# Patient Record
Sex: MALE | Race: WHITE | NOT HISPANIC OR LATINO
[De-identification: names, ages, dates, MRNs, and addresses within clinical notes are randomized per-mention and may not be internally consistent; named-entity substitution may affect disease eponyms.]

---

## 2017-10-05 ENCOUNTER — APPOINTMENT (OUTPATIENT)
Dept: ORTHOPEDIC SURGERY | Facility: CLINIC | Age: 67
End: 2017-10-05
Payer: MEDICARE

## 2017-10-05 VITALS
HEIGHT: 70 IN | BODY MASS INDEX: 22.9 KG/M2 | SYSTOLIC BLOOD PRESSURE: 127 MMHG | WEIGHT: 160 LBS | HEART RATE: 64 BPM | DIASTOLIC BLOOD PRESSURE: 81 MMHG

## 2017-10-05 DIAGNOSIS — Z78.9 OTHER SPECIFIED HEALTH STATUS: ICD-10-CM

## 2017-10-05 DIAGNOSIS — Z87.891 PERSONAL HISTORY OF NICOTINE DEPENDENCE: ICD-10-CM

## 2017-10-05 DIAGNOSIS — Z80.0 FAMILY HISTORY OF MALIGNANT NEOPLASM OF DIGESTIVE ORGANS: ICD-10-CM

## 2017-10-05 DIAGNOSIS — Z86.79 PERSONAL HISTORY OF OTHER DISEASES OF THE CIRCULATORY SYSTEM: ICD-10-CM

## 2017-10-05 PROCEDURE — 20550 NJX 1 TENDON SHEATH/LIGAMENT: CPT | Mod: LT

## 2017-10-05 PROCEDURE — 99203 OFFICE O/P NEW LOW 30 MIN: CPT | Mod: 25

## 2017-10-05 RX ORDER — CLINDAMYCIN PHOSPHATE 10 MG/ML
1 SOLUTION TOPICAL
Qty: 60 | Refills: 0 | Status: ACTIVE | COMMUNITY
Start: 2017-06-09

## 2017-10-05 RX ORDER — ATORVASTATIN CALCIUM 20 MG/1
20 TABLET, FILM COATED ORAL
Qty: 30 | Refills: 0 | Status: ACTIVE | COMMUNITY
Start: 2017-08-28

## 2017-10-05 RX ORDER — CLOPIDOGREL BISULFATE 300 MG/1
TABLET, FILM COATED ORAL
Refills: 0 | Status: ACTIVE | COMMUNITY

## 2017-10-05 RX ORDER — MELOXICAM 15 MG/1
15 TABLET ORAL
Qty: 30 | Refills: 0 | Status: ACTIVE | COMMUNITY
Start: 2017-08-17

## 2017-10-05 RX ORDER — CLOPIDOGREL BISULFATE 75 MG/1
75 TABLET, FILM COATED ORAL
Qty: 90 | Refills: 0 | Status: ACTIVE | COMMUNITY
Start: 2017-07-31

## 2017-10-05 RX ORDER — ATORVASTATIN CALCIUM 80 MG/1
TABLET, FILM COATED ORAL
Refills: 0 | Status: ACTIVE | COMMUNITY

## 2017-10-26 ENCOUNTER — APPOINTMENT (OUTPATIENT)
Dept: ORTHOPEDIC SURGERY | Facility: CLINIC | Age: 67
End: 2017-10-26
Payer: MEDICARE

## 2017-10-26 PROCEDURE — 99214 OFFICE O/P EST MOD 30 MIN: CPT

## 2017-11-06 ENCOUNTER — OUTPATIENT (OUTPATIENT)
Dept: OUTPATIENT SERVICES | Facility: HOSPITAL | Age: 67
LOS: 1 days | End: 2017-11-06
Payer: MEDICARE

## 2017-11-06 VITALS
HEART RATE: 68 BPM | WEIGHT: 162.04 LBS | SYSTOLIC BLOOD PRESSURE: 117 MMHG | DIASTOLIC BLOOD PRESSURE: 75 MMHG | TEMPERATURE: 99 F | OXYGEN SATURATION: 98 % | RESPIRATION RATE: 16 BRPM | HEIGHT: 70 IN

## 2017-11-06 DIAGNOSIS — M65.322 TRIGGER FINGER, LEFT INDEX FINGER: ICD-10-CM

## 2017-11-06 DIAGNOSIS — Z98.890 OTHER SPECIFIED POSTPROCEDURAL STATES: Chronic | ICD-10-CM

## 2017-11-06 DIAGNOSIS — I25.10 ATHEROSCLEROTIC HEART DISEASE OF NATIVE CORONARY ARTERY WITHOUT ANGINA PECTORIS: ICD-10-CM

## 2017-11-06 DIAGNOSIS — Z01.818 ENCOUNTER FOR OTHER PREPROCEDURAL EXAMINATION: ICD-10-CM

## 2017-11-06 PROCEDURE — G0463: CPT

## 2017-11-06 RX ORDER — LIDOCAINE HCL 20 MG/ML
0.2 VIAL (ML) INJECTION ONCE
Qty: 0 | Refills: 0 | Status: DISCONTINUED | OUTPATIENT
Start: 2017-11-13 | End: 2017-11-28

## 2017-11-06 RX ORDER — SODIUM CHLORIDE 9 MG/ML
3 INJECTION INTRAMUSCULAR; INTRAVENOUS; SUBCUTANEOUS EVERY 8 HOURS
Qty: 0 | Refills: 0 | Status: DISCONTINUED | OUTPATIENT
Start: 2017-11-13 | End: 2017-11-28

## 2017-11-06 RX ORDER — ACETAMINOPHEN 500 MG
975 TABLET ORAL ONCE
Qty: 0 | Refills: 0 | Status: COMPLETED | OUTPATIENT
Start: 2017-11-13 | End: 2017-11-13

## 2017-11-06 NOTE — H&P PST ADULT - NSANTHOSAYNRD_GEN_A_CORE
No. EDY screening performed.  STOP BANG Legend: 0-2 = LOW Risk; 3-4 = INTERMEDIATE Risk; 5-8 = HIGH Risk

## 2017-11-06 NOTE — H&P PST ADULT - PSH
History of lumbar laminectomy  L5-S1  History of percutaneous coronary intervention  with DEMARIO x 2, 2015

## 2017-11-06 NOTE — H&P PST ADULT - ATTENDING COMMENTS
The patient has ongoing pain left index finger. There is minimal active triggering but there is considerable pain difficulty flexing the finger. Treatment options have been reviewed with patient, including additional injections, hand therapy, splinting.  The patient  presents today for left index finger trigger release surgery, examination under anesthesia, possible partial tendon excision, possible tenosynovectomy. The patient is likely to require hand therapy postop. Patient is unlikely to regain 100% recovery and complete relief of pain.    Surgery for left index trigger finger is indicated because of symptoms refractory to conservative treatment. The patient has pain and interference with activities of daily living. While the patient may see improvement, the patient may not have complete range of motion or complete return to activities of daily living. Postoperative hand therapy may be required. The range of treatment alternatives, and the associated risks complications limitations and expectations were explained and discussed. All questions have been answered. The patient has expressed acceptance and understanding of the above and has consented to surgery.

## 2017-11-06 NOTE — H&P PST ADULT - PMH
CAD (coronary artery disease)  h/o abnormal stress text, prompted PCI with stenting x 2 arteries in 2015  on plavix and aspirin  H/O hyperlipidemia    Trigger index finger of left hand

## 2017-11-06 NOTE — H&P PST ADULT - HISTORY OF PRESENT ILLNESS
67 year old male with h/o CAD s/p PCI with stenting x2 (LAD, CX), Hyperlipidemia 67 year old male with h/o CAD s/p PCI with stenting x2 (LAD, CX) 2015, Hyperlipidemia, presents to pre-admission testing for scheduled release trigger finger left index finger, possible tenosynovectomy.

## 2017-11-06 NOTE — H&P PST ADULT - PROBLEM SELECTOR PLAN 2
no plan for plavix, left message for cardiologist  patient will stop at cardiologist office 11/6/17   will continue aspirin naty-op  most recent ekg/ stress/ echo to be obtained from cardiologist no plan for plavix, left message for cardiologist  patient stopped at cardiologist office, and note for plavix plan to be faxed to [facility]  will continue aspirin naty-op  most recent ekg/ stress/ echo to be obtained from cardiologist

## 2017-11-12 RX ORDER — CELECOXIB 200 MG/1
200 CAPSULE ORAL ONCE
Qty: 0 | Refills: 0 | Status: DISCONTINUED | OUTPATIENT
Start: 2017-11-13 | End: 2017-11-28

## 2017-11-12 RX ORDER — ONDANSETRON 8 MG/1
4 TABLET, FILM COATED ORAL ONCE
Qty: 0 | Refills: 0 | Status: DISCONTINUED | OUTPATIENT
Start: 2017-11-13 | End: 2017-11-28

## 2017-11-12 RX ORDER — SODIUM CHLORIDE 9 MG/ML
1000 INJECTION, SOLUTION INTRAVENOUS
Qty: 0 | Refills: 0 | Status: DISCONTINUED | OUTPATIENT
Start: 2017-11-13 | End: 2017-11-28

## 2017-11-13 ENCOUNTER — TRANSCRIPTION ENCOUNTER (OUTPATIENT)
Age: 67
End: 2017-11-13

## 2017-11-13 ENCOUNTER — RESULT REVIEW (OUTPATIENT)
Age: 67
End: 2017-11-13

## 2017-11-13 ENCOUNTER — APPOINTMENT (OUTPATIENT)
Dept: ORTHOPEDIC SURGERY | Facility: HOSPITAL | Age: 67
End: 2017-11-13

## 2017-11-13 ENCOUNTER — OUTPATIENT (OUTPATIENT)
Dept: OUTPATIENT SERVICES | Facility: HOSPITAL | Age: 67
LOS: 1 days | End: 2017-11-13
Payer: MEDICARE

## 2017-11-13 VITALS
HEART RATE: 55 BPM | RESPIRATION RATE: 16 BRPM | WEIGHT: 162.04 LBS | SYSTOLIC BLOOD PRESSURE: 125 MMHG | TEMPERATURE: 98 F | DIASTOLIC BLOOD PRESSURE: 74 MMHG | HEIGHT: 70 IN | OXYGEN SATURATION: 98 %

## 2017-11-13 VITALS
HEART RATE: 52 BPM | DIASTOLIC BLOOD PRESSURE: 75 MMHG | RESPIRATION RATE: 15 BRPM | OXYGEN SATURATION: 100 % | SYSTOLIC BLOOD PRESSURE: 138 MMHG

## 2017-11-13 DIAGNOSIS — Z98.890 OTHER SPECIFIED POSTPROCEDURAL STATES: Chronic | ICD-10-CM

## 2017-11-13 DIAGNOSIS — Z01.818 ENCOUNTER FOR OTHER PREPROCEDURAL EXAMINATION: ICD-10-CM

## 2017-11-13 DIAGNOSIS — M65.322 TRIGGER FINGER, LEFT INDEX FINGER: ICD-10-CM

## 2017-11-13 PROCEDURE — 26145 TENDON EXCISION PALM/FINGER: CPT | Mod: F1

## 2017-11-13 PROCEDURE — 88304 TISSUE EXAM BY PATHOLOGIST: CPT | Mod: 26

## 2017-11-13 PROCEDURE — 88304 TISSUE EXAM BY PATHOLOGIST: CPT

## 2017-11-13 RX ORDER — CELECOXIB 200 MG/1
200 CAPSULE ORAL ONCE
Qty: 0 | Refills: 0 | Status: COMPLETED | OUTPATIENT
Start: 2017-11-13 | End: 2017-11-13

## 2017-11-13 RX ADMIN — Medication 975 MILLIGRAM(S): at 09:39

## 2017-11-13 RX ADMIN — CELECOXIB 200 MILLIGRAM(S): 200 CAPSULE ORAL at 09:39

## 2017-11-13 NOTE — ASU DISCHARGE PLAN (ADULT/PEDIATRIC). - MEDICATION SUMMARY - MEDICATIONS TO TAKE
I will START or STAY ON the medications listed below when I get home from the hospital:    aspirin 81 mg oral tablet  -- 1 tab(s) by mouth once a day  -- Indication: For Home medication    atorvastatin 20 mg oral tablet  -- 1 tab(s) by mouth once a day  -- Indication: For Home medication    clopidogrel 75 mg oral tablet  -- 1 tab(s) by mouth once a day  -- Indication: For Home medication    Pepcid 20 mg oral tablet  -- one tablet the evening before and one tablet the day of surgery per protocol  -- Indication: For Home medication

## 2017-11-13 NOTE — ASU DISCHARGE PLAN (ADULT/PEDIATRIC). - NOTIFY
Bleeding that does not stop/Pain not relieved by Medications/Numbness, color, or temperature change to extremity/Swelling that continues/Fever greater than 101

## 2017-11-13 NOTE — BRIEF OPERATIVE NOTE - PROCEDURE
<<-----Click on this checkbox to enter Procedure Trigger finger release of left hand  11/13/2017    Active  ASATIN

## 2017-11-20 ENCOUNTER — APPOINTMENT (OUTPATIENT)
Dept: ORTHOPEDIC SURGERY | Facility: CLINIC | Age: 67
End: 2017-11-20
Payer: MEDICARE

## 2017-11-20 LAB — SURGICAL PATHOLOGY STUDY: SIGNIFICANT CHANGE UP

## 2017-11-20 PROCEDURE — 99024 POSTOP FOLLOW-UP VISIT: CPT

## 2017-12-26 ENCOUNTER — MESSAGE (OUTPATIENT)
Age: 67
End: 2017-12-26

## 2018-01-10 ENCOUNTER — MOBILE ON CALL (OUTPATIENT)
Age: 68
End: 2018-01-10

## 2018-01-11 ENCOUNTER — APPOINTMENT (OUTPATIENT)
Dept: ORTHOPEDIC SURGERY | Facility: CLINIC | Age: 68
End: 2018-01-11
Payer: MEDICARE

## 2018-01-11 DIAGNOSIS — Z00.00 ENCOUNTER FOR GENERAL ADULT MEDICAL EXAMINATION W/OUT ABNORMAL FINDINGS: ICD-10-CM

## 2018-01-11 PROCEDURE — 20526 THER INJECTION CARP TUNNEL: CPT | Mod: 79,RT

## 2018-01-11 PROCEDURE — 99213 OFFICE O/P EST LOW 20 MIN: CPT | Mod: 25,24

## 2018-04-12 ENCOUNTER — APPOINTMENT (OUTPATIENT)
Dept: ORTHOPEDIC SURGERY | Facility: CLINIC | Age: 68
End: 2018-04-12
Payer: MEDICARE

## 2018-04-12 PROCEDURE — 20526 THER INJECTION CARP TUNNEL: CPT | Mod: RT

## 2018-04-12 PROCEDURE — 99214 OFFICE O/P EST MOD 30 MIN: CPT | Mod: 25

## 2018-05-31 ENCOUNTER — MESSAGE (OUTPATIENT)
Age: 68
End: 2018-05-31

## 2018-06-26 ENCOUNTER — APPOINTMENT (OUTPATIENT)
Dept: ORTHOPEDIC SURGERY | Facility: CLINIC | Age: 68
End: 2018-06-26
Payer: MEDICARE

## 2018-06-26 PROCEDURE — 99214 OFFICE O/P EST MOD 30 MIN: CPT

## 2018-07-17 ENCOUNTER — APPOINTMENT (OUTPATIENT)
Dept: NEUROLOGY | Facility: CLINIC | Age: 68
End: 2018-07-17
Payer: MEDICARE

## 2018-07-17 PROCEDURE — 95885 MUSC TST DONE W/NERV TST LIM: CPT

## 2018-07-17 PROCEDURE — 95910 NRV CNDJ TEST 7-8 STUDIES: CPT

## 2018-07-23 ENCOUNTER — MESSAGE (OUTPATIENT)
Age: 68
End: 2018-07-23

## 2018-08-06 ENCOUNTER — OUTPATIENT (OUTPATIENT)
Dept: OUTPATIENT SERVICES | Facility: HOSPITAL | Age: 68
LOS: 1 days | End: 2018-08-06
Payer: MEDICARE

## 2018-08-06 VITALS
DIASTOLIC BLOOD PRESSURE: 80 MMHG | RESPIRATION RATE: 16 BRPM | TEMPERATURE: 98 F | OXYGEN SATURATION: 98 % | SYSTOLIC BLOOD PRESSURE: 145 MMHG | WEIGHT: 162.04 LBS | HEART RATE: 52 BPM | HEIGHT: 70 IN

## 2018-08-06 DIAGNOSIS — Z98.890 OTHER SPECIFIED POSTPROCEDURAL STATES: Chronic | ICD-10-CM

## 2018-08-06 DIAGNOSIS — M65.30 TRIGGER FINGER, UNSPECIFIED FINGER: Chronic | ICD-10-CM

## 2018-08-06 DIAGNOSIS — M65.331 TRIGGER FINGER, RIGHT MIDDLE FINGER: ICD-10-CM

## 2018-08-06 DIAGNOSIS — G56.01 CARPAL TUNNEL SYNDROME, RIGHT UPPER LIMB: ICD-10-CM

## 2018-08-06 DIAGNOSIS — Z01.818 ENCOUNTER FOR OTHER PREPROCEDURAL EXAMINATION: ICD-10-CM

## 2018-08-06 DIAGNOSIS — Z95.5 PRESENCE OF CORONARY ANGIOPLASTY IMPLANT AND GRAFT: ICD-10-CM

## 2018-08-06 LAB
ANION GAP SERPL CALC-SCNC: 11 MMOL/L — SIGNIFICANT CHANGE UP (ref 5–17)
BUN SERPL-MCNC: 14 MG/DL — SIGNIFICANT CHANGE UP (ref 7–23)
CALCIUM SERPL-MCNC: 9.3 MG/DL — SIGNIFICANT CHANGE UP (ref 8.4–10.5)
CHLORIDE SERPL-SCNC: 104 MMOL/L — SIGNIFICANT CHANGE UP (ref 96–108)
CO2 SERPL-SCNC: 24 MMOL/L — SIGNIFICANT CHANGE UP (ref 22–31)
CREAT SERPL-MCNC: 0.93 MG/DL — SIGNIFICANT CHANGE UP (ref 0.5–1.3)
GLUCOSE SERPL-MCNC: 94 MG/DL — SIGNIFICANT CHANGE UP (ref 70–99)
HCT VFR BLD CALC: 40.8 % — SIGNIFICANT CHANGE UP (ref 39–50)
HGB BLD-MCNC: 14.1 G/DL — SIGNIFICANT CHANGE UP (ref 13–17)
MCHC RBC-ENTMCNC: 30.5 PG — SIGNIFICANT CHANGE UP (ref 27–34)
MCHC RBC-ENTMCNC: 34.6 GM/DL — SIGNIFICANT CHANGE UP (ref 32–36)
MCV RBC AUTO: 88.1 FL — SIGNIFICANT CHANGE UP (ref 80–100)
PLATELET # BLD AUTO: 139 K/UL — LOW (ref 150–400)
POTASSIUM SERPL-MCNC: 4.2 MMOL/L — SIGNIFICANT CHANGE UP (ref 3.5–5.3)
POTASSIUM SERPL-SCNC: 4.2 MMOL/L — SIGNIFICANT CHANGE UP (ref 3.5–5.3)
RBC # BLD: 4.63 M/UL — SIGNIFICANT CHANGE UP (ref 4.2–5.8)
RBC # FLD: 14 % — SIGNIFICANT CHANGE UP (ref 10.3–14.5)
SODIUM SERPL-SCNC: 139 MMOL/L — SIGNIFICANT CHANGE UP (ref 135–145)
WBC # BLD: 5.43 K/UL — SIGNIFICANT CHANGE UP (ref 3.8–10.5)
WBC # FLD AUTO: 5.43 K/UL — SIGNIFICANT CHANGE UP (ref 3.8–10.5)

## 2018-08-06 PROCEDURE — 85027 COMPLETE CBC AUTOMATED: CPT

## 2018-08-06 PROCEDURE — G0463: CPT

## 2018-08-06 PROCEDURE — 80048 BASIC METABOLIC PNL TOTAL CA: CPT

## 2018-08-06 RX ORDER — CELECOXIB 200 MG/1
200 CAPSULE ORAL ONCE
Qty: 0 | Refills: 0 | Status: COMPLETED | OUTPATIENT
Start: 2018-08-13 | End: 2018-08-13

## 2018-08-06 RX ORDER — SODIUM CHLORIDE 9 MG/ML
3 INJECTION INTRAMUSCULAR; INTRAVENOUS; SUBCUTANEOUS EVERY 8 HOURS
Qty: 0 | Refills: 0 | Status: DISCONTINUED | OUTPATIENT
Start: 2018-08-13 | End: 2018-08-28

## 2018-08-06 RX ORDER — ACETAMINOPHEN 500 MG
1000 TABLET ORAL ONCE
Qty: 0 | Refills: 0 | Status: COMPLETED | OUTPATIENT
Start: 2018-08-13 | End: 2018-08-13

## 2018-08-06 RX ORDER — LIDOCAINE HCL 20 MG/ML
0.2 VIAL (ML) INJECTION ONCE
Qty: 0 | Refills: 0 | Status: DISCONTINUED | OUTPATIENT
Start: 2018-08-13 | End: 2018-08-28

## 2018-08-06 RX ORDER — FAMOTIDINE 10 MG/ML
0 INJECTION INTRAVENOUS
Qty: 0 | Refills: 0 | COMMUNITY

## 2018-08-06 RX ORDER — ATORVASTATIN CALCIUM 80 MG/1
1 TABLET, FILM COATED ORAL
Qty: 0 | Refills: 0 | COMMUNITY

## 2018-08-06 NOTE — H&P PST ADULT - PSH
History of lumbar laminectomy  L5-S1  History of percutaneous coronary intervention  with DEMARIO x 2, 2015 History of lumbar laminectomy  L5-S1    ' 80's  History of percutaneous coronary intervention  ' 2016     with DEMARIO x 2, 2015 History of lumbar laminectomy  L5-S1    ' 80's  History of percutaneous coronary intervention  ' 2016     with DEMARIO x 2, 2016  Trigger finger  11/2017   Repair of : Left Hand

## 2018-08-06 NOTE — H&P PST ADULT - HISTORY OF PRESENT ILLNESS
67 year old male with h/o CAD s/p PCI with stenting x2 (LAD, CX) 2015, Hyperlipidemia, presents to pre-admission testing for scheduled release trigger finger left index finger, possible tenosynovectomy. This is a 69y/o male with PMH:  HLD, CAD: s/p ( '16): Coronary DEMARIO X2 : currently on PLavix/ ASA. * last dose Plavix is 8-6-18 as per cardiologist; Will remain on ASA for Coronary Stent protection. Current dx: Right Carpal Tunnel Syndrome/ Right Long Trigger Finger. "worsening". Scheduled: Right Wrist Carpal Tunnel Release/ Right Long Finger Trigger Release.

## 2018-08-06 NOTE — H&P PST ADULT - ATTENDING COMMENTS
Patient has right carpal tunnel syndrome, confirmed with electrodiagnostic studies. No ulnar neuropathy identified. Right carpal tunnel release is indicated. Patient continues to have considerable pain, right index finger. Right index A1 pulley release, and possible tenosynovectomy will be performed.  The patient has painless triggering right long finger with isolated FDS/PIP flexion, but no pain. No triggering or pain associated with composite long finger flexion. Release of long finger A1 pulley is being considered.  Surgery for right carpal tunnel syndrome is indicated because of symptoms refractory to conservative treatment, interfering with sleep, and activities of daily living. Because of the duration and severity of carpal tunnel syndrome, ongoing symptoms can be expected postoperatively. While the patient may see improvement, there is no assurance of this. The possibility of little improvement or of no improvement exists. Even though it is low, the possibility of worsening exists as well. Risk of injury to the median nerve, CRPS, persistent paresthesias, wound related pain, weakness, and many other factors, reviewed and discussed.  Surgery for right index trigger finger is indicated because of symptoms refractory to conservative treatment.   The patient has painless triggering right long finger with isolated FDS/PIP flexion, but no pain. No triggering or pain associated with composite long finger flexion. Release of long finger A1 pulley will be decided preoperatively.  The patient has pain and interference with activities of daily living. While the patient may see improvement, the patient may not have complete range of motion or complete return to activities of daily living.   Postoperative hand therapy, and other treatment modalities may be required. A lengthy and detailed discussion has been held with the patient. The surgical plan, alternative treatments, and the associated risks, complications, limitations, and expectations have been discussed with the patient. Postoperative plan, need for exercising to regain motion and function, wound care, dressing care, activities, follow up, and possible need for hand therapy have been reviewed and discussed. In addition, the expectation of postop wound related pain, wound induration, swelling, weakness of , alteration of hand and finger use and function, and palmar and forearm tenderness were reviewed. In particular, the expectation of weakness, difficulty with daily activities, and wound induration often lasting six months have been stressed.   All questions have been answered. The patient has expressed understanding and acceptance of the above, and has consented to surgery. Patient has right carpal tunnel syndrome, confirmed with electrodiagnostic studies. No ulnar neuropathy identified. Right carpal tunnel release is indicated. Patient continues to have considerable pain, right index finger. Right index A1 pulley release, and possible tenosynovectomy will be performed.  The patient has painless triggering right long finger with isolated FDS/PIP flexion, but no pain. No triggering or pain associated with composite long finger flexion. Patient has had a few episodes of triggering of right little finger. After discussing treatment options, decision made to treat right long and little trigger fingers with cortisone injections.  Surgery for right carpal tunnel syndrome is indicated because of symptoms refractory to conservative treatment, interfering with sleep, and activities of daily living. Because of the duration and severity of carpal tunnel syndrome, ongoing symptoms can be expected postoperatively. While the patient may see improvement, there is no assurance of this. The possibility of little improvement or of no improvement exists. Even though it is low, the possibility of worsening exists as well. Risk of injury to the median nerve, CRPS, persistent paresthesias, wound related pain, weakness, and many other factors, reviewed and discussed.  Surgery for right index trigger finger is indicated because of symptoms refractory to conservative treatment.   Cortisone injections for right long and little fingers are planned.  The patient has pain and interference with activities of daily living. While the patient may see improvement, the patient may not have complete range of motion or complete return to activities of daily living.   Postoperative hand therapy, and other treatment modalities may be required. A lengthy and detailed discussion has been held with the patient. The surgical plan, alternative treatments, and the associated risks, complications, limitations, and expectations have been discussed with the patient. Postoperative plan, need for exercising to regain motion and function, wound care, dressing care, activities, follow up, and possible need for hand therapy have been reviewed and discussed. In addition, the expectation of postop wound related pain, wound induration, swelling, weakness of , alteration of hand and finger use and function, and palmar and forearm tenderness were reviewed. In particular, the expectation of weakness, difficulty with daily activities, and wound induration often lasting six months have been stressed.   All questions have been answered. The patient has expressed understanding and acceptance of the above, and has consented to surgery.

## 2018-08-06 NOTE — H&P PST ADULT - PMH
CAD (coronary artery disease)  h/o abnormal stress text, prompted PCI with stenting x 2 arteries in 2015  on plavix and aspirin  H/O hyperlipidemia    Trigger index finger of left hand CAD (coronary artery disease)  h/o abnormal stress text, prompted PCI with stenting x 2 arteries in 2015  on plavix and aspirin  Constipation    History of osteoarthritis    Hyperlipidemia    Trigger index finger of left hand  11/2017   Left 2nd Finger CAD (coronary artery disease)  h/o abnormal stress text, prompted PCI with stenting x 2 arteries in 2016  on plavix and aspirin  Carpal tunnel syndrome    Constipation    Former cigarette smoker  0.5 ppd  X 20 years. Quit ' 90's  History of osteoarthritis    Hyperlipidemia    Trigger index finger of left hand  11/2017   Left 2nd Finger

## 2018-08-07 PROBLEM — M65.322 TRIGGER FINGER, LEFT INDEX FINGER: Chronic | Status: ACTIVE | Noted: 2017-11-06

## 2018-08-07 PROBLEM — Z86.39 PERSONAL HISTORY OF OTHER ENDOCRINE, NUTRITIONAL AND METABOLIC DISEASE: Chronic | Status: INACTIVE | Noted: 2017-11-06 | Resolved: 2018-08-06

## 2018-08-09 ENCOUNTER — APPOINTMENT (OUTPATIENT)
Dept: ORTHOPEDIC SURGERY | Facility: CLINIC | Age: 68
End: 2018-08-09
Payer: MEDICARE

## 2018-08-09 VITALS
BODY MASS INDEX: 23.19 KG/M2 | WEIGHT: 162 LBS | HEIGHT: 70 IN | SYSTOLIC BLOOD PRESSURE: 144 MMHG | DIASTOLIC BLOOD PRESSURE: 81 MMHG | HEART RATE: 53 BPM

## 2018-08-09 PROCEDURE — 99213 OFFICE O/P EST LOW 20 MIN: CPT

## 2018-08-12 ENCOUNTER — TRANSCRIPTION ENCOUNTER (OUTPATIENT)
Age: 68
End: 2018-08-12

## 2018-08-13 ENCOUNTER — OUTPATIENT (OUTPATIENT)
Dept: OUTPATIENT SERVICES | Facility: HOSPITAL | Age: 68
LOS: 1 days | End: 2018-08-13
Payer: MEDICARE

## 2018-08-13 ENCOUNTER — RESULT REVIEW (OUTPATIENT)
Age: 68
End: 2018-08-13

## 2018-08-13 ENCOUNTER — APPOINTMENT (OUTPATIENT)
Dept: ORTHOPEDIC SURGERY | Facility: HOSPITAL | Age: 68
End: 2018-08-13

## 2018-08-13 VITALS
SYSTOLIC BLOOD PRESSURE: 160 MMHG | DIASTOLIC BLOOD PRESSURE: 71 MMHG | HEART RATE: 51 BPM | OXYGEN SATURATION: 100 % | TEMPERATURE: 97 F

## 2018-08-13 VITALS
HEART RATE: 49 BPM | SYSTOLIC BLOOD PRESSURE: 138 MMHG | WEIGHT: 162.04 LBS | OXYGEN SATURATION: 99 % | TEMPERATURE: 97 F | RESPIRATION RATE: 16 BRPM | DIASTOLIC BLOOD PRESSURE: 86 MMHG | HEIGHT: 70 IN

## 2018-08-13 DIAGNOSIS — M65.30 TRIGGER FINGER, UNSPECIFIED FINGER: Chronic | ICD-10-CM

## 2018-08-13 DIAGNOSIS — G56.01 CARPAL TUNNEL SYNDROME, RIGHT UPPER LIMB: ICD-10-CM

## 2018-08-13 DIAGNOSIS — Z98.890 OTHER SPECIFIED POSTPROCEDURAL STATES: Chronic | ICD-10-CM

## 2018-08-13 DIAGNOSIS — M65.331 TRIGGER FINGER, RIGHT MIDDLE FINGER: ICD-10-CM

## 2018-08-13 PROCEDURE — 20550 NJX 1 TENDON SHEATH/LIGAMENT: CPT | Mod: 59,F9

## 2018-08-13 PROCEDURE — 88304 TISSUE EXAM BY PATHOLOGIST: CPT

## 2018-08-13 PROCEDURE — 64721 CARPAL TUNNEL SURGERY: CPT | Mod: RT

## 2018-08-13 PROCEDURE — 88304 TISSUE EXAM BY PATHOLOGIST: CPT | Mod: 26

## 2018-08-13 PROCEDURE — 26145 TENDON EXCISION PALM/FINGER: CPT | Mod: F6

## 2018-08-13 PROCEDURE — 20600 DRAIN/INJ JOINT/BURSA W/O US: CPT

## 2018-08-13 PROCEDURE — 26145 TENDON EXCISION PALM/FINGER: CPT | Mod: RT

## 2018-08-13 RX ORDER — SODIUM CHLORIDE 9 MG/ML
1000 INJECTION, SOLUTION INTRAVENOUS
Qty: 0 | Refills: 0 | Status: DISCONTINUED | OUTPATIENT
Start: 2018-08-13 | End: 2018-08-28

## 2018-08-13 RX ORDER — OXYCODONE HYDROCHLORIDE 5 MG/1
5 TABLET ORAL ONCE
Qty: 0 | Refills: 0 | Status: DISCONTINUED | OUTPATIENT
Start: 2018-08-13 | End: 2018-08-13

## 2018-08-13 RX ORDER — ONDANSETRON 8 MG/1
4 TABLET, FILM COATED ORAL ONCE
Qty: 0 | Refills: 0 | Status: DISCONTINUED | OUTPATIENT
Start: 2018-08-13 | End: 2018-08-28

## 2018-08-13 RX ADMIN — Medication 1000 MILLIGRAM(S): at 07:42

## 2018-08-13 RX ADMIN — CELECOXIB 200 MILLIGRAM(S): 200 CAPSULE ORAL at 07:42

## 2018-08-13 NOTE — ASU PATIENT PROFILE, ADULT - PSH
History of lumbar laminectomy  L5-S1    ' 80's  History of percutaneous coronary intervention  ' 2016     with DEMARIO x 2, 2016  Trigger finger  11/2017   Repair of : Left Hand

## 2018-08-13 NOTE — ASU DISCHARGE PLAN (ADULT/PEDIATRIC). - NURSING INSTRUCTIONS
Tylenol as needed for pain.  Next dose OK @ 12:45pm this afternoon.  Keep dressing dry.  Keep right hand elevated as much as possible.  Please read and follow preprinted MD-specific instruction sheet provided.  You may resume all preop medications/supplements as originally prescribed.  Call office for follow-up appt.

## 2018-08-13 NOTE — ASU DISCHARGE PLAN (ADULT/PEDIATRIC). - MEDICATION SUMMARY - MEDICATIONS TO TAKE
I will START or STAY ON the medications listed below when I get home from the hospital:    stool softner  -- 1 tab(s) by mouth once a day  -- Indication: For med    aspirin 81 mg oral tablet  -- 1 tab(s) by mouth once a day  -- Indication: For med    Tylenol 325 mg oral tablet  -- 2 tab(s) by mouth every 4 hours, As Needed  -- Indication: For med    atorvastatin 20 mg oral tablet  -- 1 tab(s) by mouth once a day (at bedtime)  -- Indication: For med    clopidogrel 75 mg oral tablet  -- 1 tab(s) by mouth once a day. * Last dose is 8-6-18 as per cardiologist  -- Indication: For med    Osteo Bi-Flex 250 mg-200 mg oral tablet  -- 1 dose(s) by mouth once a day  -- Indication: For med    Acidophilus oral tablet  -- 1 tab(s) by mouth once a day  -- Indication: For med

## 2018-08-13 NOTE — ASU PATIENT PROFILE, ADULT - PMH
CAD (coronary artery disease)  h/o abnormal stress text, prompted PCI with stenting x 2 arteries in 2016  on plavix and aspirin  Carpal tunnel syndrome    Constipation    Former cigarette smoker  0.5 ppd  X 20 years. Quit ' 90's  History of osteoarthritis    Hyperlipidemia    Trigger index finger of left hand  11/2017   Left 2nd Finger

## 2018-08-13 NOTE — ASU DISCHARGE PLAN (ADULT/PEDIATRIC). - NOTIFY
Fever greater than 101/Bleeding that does not stop/Pain not relieved by Medications/Numbness, tingling/Numbness, color, or temperature change to extremity

## 2018-08-16 LAB — SURGICAL PATHOLOGY STUDY: SIGNIFICANT CHANGE UP

## 2018-08-20 ENCOUNTER — APPOINTMENT (OUTPATIENT)
Dept: ORTHOPEDIC SURGERY | Facility: CLINIC | Age: 68
End: 2018-08-20
Payer: MEDICARE

## 2018-08-20 DIAGNOSIS — M65.322 TRIGGER FINGER, LEFT INDEX FINGER: ICD-10-CM

## 2018-08-20 DIAGNOSIS — M65.321 TRIGGER FINGER, RIGHT INDEX FINGER: ICD-10-CM

## 2018-08-20 PROCEDURE — 99024 POSTOP FOLLOW-UP VISIT: CPT

## 2019-02-06 PROBLEM — K59.00 CONSTIPATION, UNSPECIFIED: Chronic | Status: ACTIVE | Noted: 2018-08-06

## 2019-02-06 PROBLEM — G56.00 CARPAL TUNNEL SYNDROME, UNSPECIFIED UPPER LIMB: Chronic | Status: ACTIVE | Noted: 2018-08-06

## 2019-02-06 PROBLEM — Z87.891 PERSONAL HISTORY OF NICOTINE DEPENDENCE: Chronic | Status: ACTIVE | Noted: 2018-08-06

## 2019-02-06 PROBLEM — Z87.39 PERSONAL HISTORY OF OTHER DISEASES OF THE MUSCULOSKELETAL SYSTEM AND CONNECTIVE TISSUE: Chronic | Status: ACTIVE | Noted: 2018-08-06

## 2019-02-06 PROBLEM — E78.5 HYPERLIPIDEMIA, UNSPECIFIED: Chronic | Status: ACTIVE | Noted: 2018-08-06

## 2019-02-28 ENCOUNTER — APPOINTMENT (OUTPATIENT)
Dept: ORTHOPEDIC SURGERY | Facility: CLINIC | Age: 69
End: 2019-02-28
Payer: MEDICARE

## 2019-02-28 VITALS
HEIGHT: 70 IN | HEART RATE: 60 BPM | DIASTOLIC BLOOD PRESSURE: 72 MMHG | SYSTOLIC BLOOD PRESSURE: 124 MMHG | WEIGHT: 162 LBS | BODY MASS INDEX: 23.19 KG/M2

## 2019-02-28 DIAGNOSIS — G56.01 CARPAL TUNNEL SYNDROME, RIGHT UPPER LIMB: ICD-10-CM

## 2019-02-28 PROCEDURE — 99214 OFFICE O/P EST MOD 30 MIN: CPT

## 2019-02-28 NOTE — REASON FOR VISIT
Continue Regimen: Fluorouracil to left thigh, left arm, tops of feet twice daily x 4 weeks or until she turns pink [Follow-Up Visit] : a follow-up visit for [Trigger Finger] : trigger finger Detail Level: Zone

## 2019-03-03 PROBLEM — G56.01 CARPAL TUNNEL SYNDROME OF RIGHT WRIST: Status: ACTIVE | Noted: 2017-10-05

## 2019-03-03 NOTE — HISTORY OF PRESENT ILLNESS
[FreeTextEntry1] : 69y/o RHD male presents with pain and triggering of Right small finger for about 2 months. Patient previously had a cortisone injection for Right small finger trigger finger in August 2018 (at same time of right CTR). He states the cortisone injection helped for about 4 months. He reports the pain returned and now he has difficulty flexing the right small finger. Patient denies taking any medication for pain. He denies any numbness or tingling. \par \par Pt reports that sensation in finger tips is nearly normal, minimally decreased in hyponychial regions of each finger tip. Otherwise, he notes normal light touch sensation in the fingers.\par Hx: Right carpal tunnel release, right index finger trigger release with tenosynovectomy, cortisone injection, and long finger and ring finger trigger fingers, August 13, 2018. \par

## 2019-03-03 NOTE — PATIENT INSTRUCTIONS
[FreeTextEntry1] : HAND SURGERY PATIENTS\par Instructions: Trigger finger, CTR, de Quervain's, etc. \par \par 1. Maintain hand elevation for 24 to 48 hours. Elevation is one of the best ways to control pain. Swelling usually peaks during this period. After 48 hours, you do not need to maintain elevation if there is no "throbbing" when your hand is lowered. NOTE: Your hand does not have to be elevated continuously. \par \par 2. Bathing: Keep your dressing dry. You may wash exposed fingers with a washcloth. You may shower or bathe with a plastic bag protecting the dressing/cast. Once you've changed the dressing, you may bathe with a disposable glove or bag over the wound.\par \par 3. Dressing: Ask if you may change your dressing two days after surgery. If changing the dressing is allowed, apply 3" x 3" gauze and secured with 2 inch generic Coban. Change bandage as needed. May remove bandage and may use hand for ADL with no dressing, if there is no bleeding, inside the house. Once you change the dressing, you must reapply a new clean dressing at least once every day.\par \par 4. Exercise: It is important to move your fingers, shoulder, and elbow to prevent stiffness. Fully opening and closing your several times per day to maintain full range of motion is essential. When you can readily open and close your fingers fully without pain, you may reduce the frequency of exercising. Nonetheless, it is important to exercise 3 to 4 times each day, even when movement becomes easy and painless. You may perform simple activities under 2 to 3 pounds, e.g., holding a phone, writing, simple keyboarding, using eating utensils. \par \par 5. Pain: Numbness from the nerve block (local anesthesia) usually lasts 4-8 hours, but sometimes lasts more than 24 hours. Once pain starts, take pain medicine as prescribed. Remember, elevation is one of the best ways to control pain. Pain is normal during and following exercise periods. If necessary, take the prescribed medicine. Ask if you can substitute Tylenol, Advil, or Aleve. Note: You must not take more than 4000mg of Tylenol in 24 hours.\par \par 6. Bleeding: Blood staining on your dressing is very common, as is the appearance of "black and blue on exposed fingers or arm.\par \par 7. Swelling: Some finger swelling usually occurs. Exercises and elevation will help control swelling. Ask if you may loosen the dressing. It is important to verify that you may do this.\par \par 8. Infection: Post operative infections are rare. If you get PROGRESSIVE redness, swelling, and pain for more than 24 hour that does not respond to elevation and rest, or if you start to run a fever, call the office: [phone].\par \par 9. Call to schedule a follow up appointment, even if you read this prior to surgery. Arrange follow up approximately one week post operatively, or at the time recommended by your surgeon. \par \par Thank you.\par Shahzad Sr MD\par \par \par

## 2019-03-03 NOTE — PHYSICAL EXAM
[de-identified] : Right-hand\par Little finger A1 pulley tender. Mild swelling.\par Active flexion, limited because of pain and guarding.\par Full passive flexion little finger, when performed along with all of the fingers simultaneously.\par Little finger locks and triggers into extension with associated discomfort.\par Long finger: Provocable triggering. Minimal discomfort if any pain.\par Active triggering right long finger, later during exam.\par There is no other A1 pulley tenderness or triggering in any other finger, right hand.\par Patient not able to make a full tight fist with maximum power because of guarding of little finger and some limited effort involving flexion of long finger.\par Active PIP extension -20°. Passive 0°.\par \par Left hand\par No A1 pulley tenderness and no triggering in any finger.\par No pertinent MP, PIP, or DIP joint contributory findings, except some Heberden's nodes; none are clinically painful.\par \par Neurologic\par Right-hand sensation in median distribution is virtually full except for minimally altered sensation in a small area of hyponychial regions of the median innervated fingertips.\par Radial nerve motor and sensory and ulnar nerve motor and sensory are intact.\par Normal sensation of finger\par Phalen's test with median nerve compression: Negative at 60 seconds left-hand and right-hand\par Skin: No cyanosis, clubbing, edema or rashes.\par Vascular: Radial pulses intact.\par Lymphatic: No streaking or epitrochlear adenopathy.\par The patient is awake, alert, and oriented. Affect appropriate. Cooperative.

## 2019-03-03 NOTE — DISCUSSION/SUMMARY
[FreeTextEntry1] : The patient has symptomatic trigger finger right long and right little finger. These interfere with function to a considerable degree. Cortisone injections have provided temporary relief. Additional cortisone injections can be done, but the statistical chance for permanent resolution is limited.\par I have reviewed treatment options with the patient. Patient has expressed to the desire to proceed with surgical treatment.\par \par Surgery for right long and right little trigger fingers is indicated because of symptoms refractory to conservative treatment. The patient has pain and interference with activities of daily living. While the patient may see improvement, the patient may not have complete range of motion or complete return to activities of daily living. Postoperative hand therapy, and other treatment modalities may be required The range of treatment alternatives, and the associated risks complications limitations and expectations were explained and discussed. Infection, incomplete range of motion, stiffness, pain, palmar fibromatosis are a just few of many factors reviewed and discussed with the patient. All questions have been answered. The patient has expressed acceptance and understanding of the above and has consented to surgery.

## 2019-03-15 ENCOUNTER — OUTPATIENT (OUTPATIENT)
Dept: OUTPATIENT SERVICES | Facility: HOSPITAL | Age: 69
LOS: 1 days | End: 2019-03-15
Payer: MEDICARE

## 2019-03-15 VITALS
OXYGEN SATURATION: 99 % | WEIGHT: 162.04 LBS | DIASTOLIC BLOOD PRESSURE: 80 MMHG | RESPIRATION RATE: 20 BRPM | TEMPERATURE: 99 F | SYSTOLIC BLOOD PRESSURE: 130 MMHG | HEART RATE: 68 BPM | HEIGHT: 70 IN

## 2019-03-15 DIAGNOSIS — M65.30 TRIGGER FINGER, UNSPECIFIED FINGER: Chronic | ICD-10-CM

## 2019-03-15 DIAGNOSIS — Z01.84 ENCOUNTER FOR ANTIBODY RESPONSE EXAMINATION: ICD-10-CM

## 2019-03-15 DIAGNOSIS — M65.351 TRIGGER FINGER, RIGHT LITTLE FINGER: ICD-10-CM

## 2019-03-15 DIAGNOSIS — Z98.890 OTHER SPECIFIED POSTPROCEDURAL STATES: Chronic | ICD-10-CM

## 2019-03-15 DIAGNOSIS — M65.331 TRIGGER FINGER, RIGHT MIDDLE FINGER: ICD-10-CM

## 2019-03-15 DIAGNOSIS — Z95.5 PRESENCE OF CORONARY ANGIOPLASTY IMPLANT AND GRAFT: ICD-10-CM

## 2019-03-15 DIAGNOSIS — M65.30 TRIGGER FINGER, UNSPECIFIED FINGER: ICD-10-CM

## 2019-03-15 LAB
ANION GAP SERPL CALC-SCNC: 13 MMOL/L — SIGNIFICANT CHANGE UP (ref 5–17)
BUN SERPL-MCNC: 23 MG/DL — SIGNIFICANT CHANGE UP (ref 7–23)
CALCIUM SERPL-MCNC: 9.5 MG/DL — SIGNIFICANT CHANGE UP (ref 8.4–10.5)
CHLORIDE SERPL-SCNC: 102 MMOL/L — SIGNIFICANT CHANGE UP (ref 96–108)
CO2 SERPL-SCNC: 24 MMOL/L — SIGNIFICANT CHANGE UP (ref 22–31)
CREAT SERPL-MCNC: 0.87 MG/DL — SIGNIFICANT CHANGE UP (ref 0.5–1.3)
GLUCOSE SERPL-MCNC: 98 MG/DL — SIGNIFICANT CHANGE UP (ref 70–99)
HCT VFR BLD CALC: 44.3 % — SIGNIFICANT CHANGE UP (ref 39–50)
HGB BLD-MCNC: 14.5 G/DL — SIGNIFICANT CHANGE UP (ref 13–17)
MCHC RBC-ENTMCNC: 30 PG — SIGNIFICANT CHANGE UP (ref 27–34)
MCHC RBC-ENTMCNC: 32.7 GM/DL — SIGNIFICANT CHANGE UP (ref 32–36)
MCV RBC AUTO: 91.5 FL — SIGNIFICANT CHANGE UP (ref 80–100)
PLATELET # BLD AUTO: 155 K/UL — SIGNIFICANT CHANGE UP (ref 150–400)
POTASSIUM SERPL-MCNC: 5 MMOL/L — SIGNIFICANT CHANGE UP (ref 3.5–5.3)
POTASSIUM SERPL-SCNC: 5 MMOL/L — SIGNIFICANT CHANGE UP (ref 3.5–5.3)
RBC # BLD: 4.84 M/UL — SIGNIFICANT CHANGE UP (ref 4.2–5.8)
RBC # FLD: 12.9 % — SIGNIFICANT CHANGE UP (ref 10.3–14.5)
SODIUM SERPL-SCNC: 139 MMOL/L — SIGNIFICANT CHANGE UP (ref 135–145)
WBC # BLD: 6.63 K/UL — SIGNIFICANT CHANGE UP (ref 3.8–10.5)
WBC # FLD AUTO: 6.63 K/UL — SIGNIFICANT CHANGE UP (ref 3.8–10.5)

## 2019-03-15 PROCEDURE — G0463: CPT

## 2019-03-15 PROCEDURE — 85027 COMPLETE CBC AUTOMATED: CPT

## 2019-03-15 PROCEDURE — 80048 BASIC METABOLIC PNL TOTAL CA: CPT

## 2019-03-15 RX ORDER — LIDOCAINE HCL 20 MG/ML
0.2 VIAL (ML) INJECTION ONCE
Qty: 0 | Refills: 0 | Status: DISCONTINUED | OUTPATIENT
Start: 2019-03-22 | End: 2019-04-06

## 2019-03-15 RX ORDER — ATORVASTATIN CALCIUM 80 MG/1
1 TABLET, FILM COATED ORAL
Qty: 0 | Refills: 0 | COMMUNITY

## 2019-03-15 RX ORDER — SODIUM CHLORIDE 9 MG/ML
3 INJECTION INTRAMUSCULAR; INTRAVENOUS; SUBCUTANEOUS EVERY 8 HOURS
Qty: 0 | Refills: 0 | Status: DISCONTINUED | OUTPATIENT
Start: 2019-03-22 | End: 2019-04-06

## 2019-03-15 RX ORDER — ACETAMINOPHEN 500 MG
2 TABLET ORAL
Qty: 0 | Refills: 0 | COMMUNITY

## 2019-03-15 NOTE — H&P PST ADULT - HISTORY OF PRESENT ILLNESS
68 yr old male with history of 68 yr old male with history of CAD - stented coronary artery x 2 (2016) , with trigger finger of right long & little finger, coming for release on 3/22/19.

## 2019-03-15 NOTE — H&P PST ADULT - ATTENDING COMMENTS
The patient has symptomatic trigger finger right long and right little finger. These interfere with function to a considerable degree. Cortisone injections have provided temporary relief. Additional cortisone injections can be done, but the statistical chance for permanent resolution is limited.  I have reviewed treatment options with the patient, who emphatically expressed his preference to proceed with surgical treatment, right long and little finger trigger releases.    Surgery for right long and right little trigger fingers is indicated because of symptoms refractory to conservative treatment. Plan is right long and right little trigger finger release, exam under anesthesia, and possible partial tendon excision if there is persistent triggering after release of the A1 pulley.  The patient has pain and interference with activities of daily living. While the patient may see improvement, the patient may not have complete range of motion or complete return to activities of daily living. Postoperative hand therapy, and other treatment modalities may be required The range of treatment alternatives, and the associated risks complications limitations and expectations were explained and discussed. Infection, incomplete range of motion, stiffness, pain, palmar fibromatosis are a just few of many factors reviewed and discussed with the patient. All questions have been answered. The patient has expressed acceptance and understanding of the above and has consented to surgery. The patient has symptomatic trigger finger right long and right little finger. These interfere with function to a considerable degree. Cortisone injections have provided temporary relief. Additional cortisone injections can be done, but the statistical chance for permanent resolution is limited.  I have reviewed treatment options with the patient, who emphatically expressed his preference to proceed with surgical treatment, right long and little finger trigger releases.  Today, day of surgery, ring finger A1 pulley is painful and ring finger is actively triggering. After discussion w pt and wife, pt requests release of ring finger A1 pulley, as well as long and little. This is indicated given pt history and his desire to avoid future surgery.     Surgery for right long, ring, and right little trigger fingers is indicated because of symptoms refractory to conservative treatment. Plan is right long and right little trigger finger release, exam under anesthesia, and possible partial tendon excision if there is persistent triggering after release of the A1 pulley.  The patient has pain and interference with activities of daily living. While the patient may see improvement, the patient may not have complete range of motion or complete return to activities of daily living. Postoperative hand therapy, and other treatment modalities may be required The range of treatment alternatives, and the associated risks complications limitations and expectations were explained and discussed. Infection, incomplete range of motion, stiffness, pain, palmar fibromatosis are a just few of many factors reviewed and discussed with the patient. All questions have been answered. The patient has expressed acceptance and understanding of the above and has consented to surgery.

## 2019-03-15 NOTE — H&P PST ADULT - NSICDXPASTSURGICALHX_GEN_ALL_CORE_FT
PAST SURGICAL HISTORY:  History of lumbar laminectomy L5-S1    ' 80's    History of percutaneous coronary intervention ' 2016     with DEMARIO x 2, 2016    S/P carpal tunnel release right & trigger finger    Trigger finger 11/2017   Repair of : Left Hand

## 2019-03-15 NOTE — H&P PST ADULT - NSICDXPASTMEDICALHX_GEN_ALL_CORE_FT
PAST MEDICAL HISTORY:  CAD (coronary artery disease) h/o abnormal stress text, prompted PCI with stenting x 2 arteries in 2016  on plavix and aspirin    Carpal tunnel syndrome     Constipation     Former cigarette smoker 0.5 ppd  X 20 years. Quit ' 90's    History of osteoarthritis     Hyperlipidemia     Right trigger finger     Trigger index finger of left hand 11/2017   Left 2nd Finger PAST MEDICAL HISTORY:  CAD (coronary artery disease) h/o abnormal stress text, prompted PCI with stenting x 2 arteries in 2016  on Plavix and aspirin    Carpal tunnel syndrome     Constipation     Former cigarette smoker 0.5 ppd  X 20 years. Quit ' 90's    History of osteoarthritis     Hyperlipidemia     Right trigger finger     Trigger index finger of left hand 11/2017   Left 2nd Finger

## 2019-03-15 NOTE — H&P PST ADULT - NSICDXPROBLEM_GEN_ALL_CORE_FT
PROBLEM DIAGNOSES  Problem: Trigger finger  Assessment and Plan: Right Long & Little finger trigger releases , Possible partial tendon repair     Problem: Stented coronary artery  Assessment and Plan: Stented coronary artery - to stay on aspirin until dos, Dr. Sr wants pt to stop Plavix for 2 days, prior to surgery, Pt left message with cardio, awaiting response

## 2019-03-21 ENCOUNTER — TRANSCRIPTION ENCOUNTER (OUTPATIENT)
Age: 69
End: 2019-03-21

## 2019-03-22 ENCOUNTER — OUTPATIENT (OUTPATIENT)
Dept: OUTPATIENT SERVICES | Facility: HOSPITAL | Age: 69
LOS: 1 days | End: 2019-03-22
Payer: MEDICARE

## 2019-03-22 ENCOUNTER — APPOINTMENT (OUTPATIENT)
Dept: ORTHOPEDIC SURGERY | Facility: HOSPITAL | Age: 69
End: 2019-03-22

## 2019-03-22 VITALS
SYSTOLIC BLOOD PRESSURE: 130 MMHG | HEIGHT: 70 IN | WEIGHT: 162.04 LBS | HEART RATE: 55 BPM | DIASTOLIC BLOOD PRESSURE: 82 MMHG | OXYGEN SATURATION: 100 % | TEMPERATURE: 98 F

## 2019-03-22 VITALS
SYSTOLIC BLOOD PRESSURE: 127 MMHG | RESPIRATION RATE: 20 BRPM | DIASTOLIC BLOOD PRESSURE: 70 MMHG | TEMPERATURE: 97 F | OXYGEN SATURATION: 100 % | HEART RATE: 62 BPM

## 2019-03-22 DIAGNOSIS — M65.30 TRIGGER FINGER, UNSPECIFIED FINGER: Chronic | ICD-10-CM

## 2019-03-22 DIAGNOSIS — Z98.890 OTHER SPECIFIED POSTPROCEDURAL STATES: Chronic | ICD-10-CM

## 2019-03-22 DIAGNOSIS — M65.30 TRIGGER FINGER, UNSPECIFIED FINGER: ICD-10-CM

## 2019-03-22 DIAGNOSIS — M65.351 TRIGGER FINGER, RIGHT LITTLE FINGER: ICD-10-CM

## 2019-03-22 DIAGNOSIS — M65.331 TRIGGER FINGER, RIGHT MIDDLE FINGER: ICD-10-CM

## 2019-03-22 PROCEDURE — 26055 INCISE FINGER TENDON SHEATH: CPT | Mod: F9

## 2019-03-22 PROCEDURE — 26055 INCISE FINGER TENDON SHEATH: CPT | Mod: F8

## 2019-03-22 RX ORDER — ONDANSETRON 8 MG/1
4 TABLET, FILM COATED ORAL ONCE
Qty: 0 | Refills: 0 | Status: DISCONTINUED | OUTPATIENT
Start: 2019-03-22 | End: 2019-04-06

## 2019-03-22 RX ORDER — OXYCODONE HYDROCHLORIDE 5 MG/1
1 TABLET ORAL
Qty: 4 | Refills: 0
Start: 2019-03-22

## 2019-03-22 RX ORDER — CELECOXIB 200 MG/1
200 CAPSULE ORAL ONCE
Qty: 0 | Refills: 0 | Status: DISCONTINUED | OUTPATIENT
Start: 2019-03-22 | End: 2019-04-06

## 2019-03-22 RX ORDER — ACETAMINOPHEN 500 MG
1000 TABLET ORAL ONCE
Qty: 0 | Refills: 0 | Status: COMPLETED | OUTPATIENT
Start: 2019-03-22 | End: 2019-03-22

## 2019-03-22 RX ORDER — OXYCODONE HYDROCHLORIDE 5 MG/1
5 TABLET ORAL ONCE
Qty: 0 | Refills: 0 | Status: DISCONTINUED | OUTPATIENT
Start: 2019-03-22 | End: 2019-03-22

## 2019-03-22 RX ORDER — SODIUM CHLORIDE 9 MG/ML
1000 INJECTION, SOLUTION INTRAVENOUS
Qty: 0 | Refills: 0 | Status: DISCONTINUED | OUTPATIENT
Start: 2019-03-22 | End: 2019-04-06

## 2019-03-22 RX ORDER — CELECOXIB 200 MG/1
200 CAPSULE ORAL ONCE
Qty: 0 | Refills: 0 | Status: COMPLETED | OUTPATIENT
Start: 2019-03-22 | End: 2019-03-22

## 2019-03-22 RX ADMIN — CELECOXIB 200 MILLIGRAM(S): 200 CAPSULE ORAL at 09:47

## 2019-03-22 RX ADMIN — Medication 1000 MILLIGRAM(S): at 09:46

## 2019-03-22 NOTE — PRE-ANESTHESIA EVALUATION ADULT - NSANTHADDINFOFT_GEN_ALL_CORE
Denies CP, SOB, cough, fever, acid reflux  Patient wishes to keep upper dentures in, risk including but not limited to damage of dentures, chocking, explained. Katheryn stated that the denture are glued in.

## 2019-03-22 NOTE — ASU DISCHARGE PLAN (ADULT/PEDIATRIC) - CARE PROVIDER_API CALL
Shahzad Sr (MD)  Orthopaedic Surgery; Surgery of the Hand  [address], [facility] [address]  [place] [address]  Phone: [phone]  Fax: [phone]  Follow Up Time:

## 2019-03-22 NOTE — ASU DISCHARGE PLAN (ADULT/PEDIATRIC) - NURSING INSTRUCTIONS
see instruction sheet See instruction sheet. Can resume aspirin and plavix tomorrow POD 1. Oxycodone for severe pain

## 2019-03-22 NOTE — ASU DISCHARGE PLAN (ADULT/PEDIATRIC) - CALL YOUR DOCTOR IF YOU HAVE ANY OF THE FOLLOWING:
Bleeding that does not stop/Pain not relieved by Medications/Numbness, tingling, color or temperature change to extremity

## 2019-03-22 NOTE — PRE-ANESTHESIA EVALUATION ADULT - NSANTHTIREDRD_ENT_A_CORE
No Nsaids Counseling: NSAID Counseling: I discussed with the patient that NSAIDs should be taken with food. Prolonged use of NSAIDs can result in the development of stomach ulcers.  Patient advised to stop taking NSAIDs if abdominal pain occurs.  The patient verbalized understanding of the proper use and possible adverse effects of NSAIDs.  All of the patient's questions and concerns were addressed.

## 2019-03-22 NOTE — ASU DISCHARGE PLAN (ADULT/PEDIATRIC) - FOLLOW UP APPOINTMENTS
[facility] ([facility]): 911 or go to the nearest Emergency Room

## 2019-03-28 ENCOUNTER — APPOINTMENT (OUTPATIENT)
Dept: ORTHOPEDIC SURGERY | Facility: CLINIC | Age: 69
End: 2019-03-28
Payer: MEDICARE

## 2019-03-28 DIAGNOSIS — M65.351 TRIGGER FINGER, RIGHT LITTLE FINGER: ICD-10-CM

## 2019-03-28 DIAGNOSIS — M65.331 TRIGGER FINGER, RIGHT MIDDLE FINGER: ICD-10-CM

## 2019-03-28 DIAGNOSIS — M65.341 TRIGGER FINGER, RIGHT RING FINGER: ICD-10-CM

## 2019-03-28 PROCEDURE — 99024 POSTOP FOLLOW-UP VISIT: CPT

## 2019-11-07 NOTE — ASU PATIENT PROFILE, ADULT - PAIN CHRONIC, PROFILE
This patient was seen on 11/7/19  Assessment:    Problem List Items Addressed This Visit        Endocrine    Diabetic peripheral neuropathy ([facility] [address] [address] ) - Primary (Chronic)       Musculoskeletal and Integument    Right foot ulcer, with fat layer exposed ([facility] [address] )       Other    Wound of right leg          Plan:  A formal timeout including patient identification, laterality and existing allergies using [facility] protocol was conducted  Procedure Performed: Debridement of subcutaneous tissue- <20 sq cm (10647)  Anesthetic used as needed to reduce discomfort  Under aseptic technique, the wound was thoroughly explored for undermining, deep sinus tracts and bone involvement  Sterile instrumentation including scalpel used to excise the clearly delineated devitalized subcutaneous tissue exposing viable tissue  Bleeding controlled with gentle pressure  Patient tolerated debridement without complications  The wound was dressed  Wound dressing technique and frequency described to patient  I am to be called if any signs of infection develop such as erythema, increased wound size or significant change in appearance of wound, odor, pain, increased or change in color of drainage, swelling, fever, chills, nightsweats  I reviewed these signs with the patient  I recommended limiting leg dependency and to use any prescribed compression devices in an effort to allow proper fluid control and healing of the wounded area  I was very clear that sleeping with legs dependent is to be avoided at all costs  I explained that good wound care and compliance are necessary to allow healing and to prevent limb loss  Leg wound- collagen and adaptic applied to wound base  Unna boot applied using paste wrap and coban  Applied from base of toes to infrapatellar notch  Unna boot care and precautions discussed and explained to pt/family  1  Leave wrap dry and intact  2   If wrap is too tight (feels like a tourniquet, toes turning purple), elevate the leg above heart level, if no relief, remove wrap (no scissors) and call office  3  If wrap becomes too loose, remove wrap (no scissors) and call the office  Pt/family verbalize understanding    Foot wound- collagen and dermagran gauze applied to wound base, covered with gauze and secured with tape  To remain dry and intact  Return in one week  Diagnoses and all orders for this visit:    Diabetic peripheral neuropathy ([address] )    Right foot ulcer, with fat layer exposed ([address] )    Wound of right lower extremity, subsequent encounter          Subjective:      Patient ID: Alise Flannery is a 78 y o  male  Pt arrives for wound care follow up  Presents with unna boot intact  Pt denies problems  The following portions of the patient's history were reviewed and updated as appropriate: allergies, current medications, past family history, past medical history, past social history, past surgical history and problem list     Review of Systems   Constitutional: Negative  Objective:      /76   Pulse 80   Ht 5' 8" (1 727 m)   Wt 61 9 kg (136 lb 6 4 oz)   BMI 20 74 kg/m²          Physical Exam   Constitutional: He appears well-developed and well-nourished  No distress  Skin: He is not diaphoretic  Nursing note and vitals reviewed          Wound # 1   Location: right posterior lower leg  Depth 0 2cm:   Deepest Tissue Noted in Base: SQ  Probe to Bone?: no  Peripheral Skin Description: intact  Granulation: 90% Fibrotic Tissue: 10% Necrotic Tissue: 0%  Drainage Amount: minimal  Signs of Infection: no  Total debrided 2 48 square centimeters            Wound # 2   Location: right foot wound   Depth 0 2cm:   Deepest Tissue Noted in Base: SQ  Probe to Bone?: no  Peripheral Skin Description: intact  Granulation: 90% Fibrotic Tissue: 10% Necrotic Tissue: 0%  Drainage Amount: minimal  Signs of Infection: no  Total debrided  0 08square centimeters               no

## 2020-07-21 NOTE — ASU PATIENT PROFILE, ADULT - ATTEMPT TO OOB
no I, Randolph Hill, personally saw the patient with the resident, and completed the key components of the history and physical exam. I then discussed the management plan with the resident.    39 yo F hx of polysubstance abuse, schizoaffective disorder. multiple visits to the ED in the past brought in by police for aggressive behaviour out side of DSS, trying to break in to a car, spitting on people. She was given versed 5 mg IM by EMS. patient uncooperative, given another versed 5 mg IM. blood work unremarkable. unable to get urine. patient seen and cleared by psych. patient ambulatory. Denies suicidal ideation. patient given food and ginger ale and discharged home.

## 2020-12-17 PROBLEM — M65.30 TRIGGER FINGER, UNSPECIFIED FINGER: Chronic | Status: ACTIVE | Noted: 2019-03-15

## 2020-12-23 ENCOUNTER — APPOINTMENT (OUTPATIENT)
Dept: ORTHOPEDIC SURGERY | Facility: CLINIC | Age: 70
End: 2020-12-23
Payer: MEDICARE

## 2020-12-23 VITALS — SYSTOLIC BLOOD PRESSURE: 130 MMHG | DIASTOLIC BLOOD PRESSURE: 80 MMHG | HEART RATE: 64 BPM

## 2020-12-23 VITALS — TEMPERATURE: 97 F

## 2020-12-23 DIAGNOSIS — G56.02 CARPAL TUNNEL SYNDROME, LEFT UPPER LIMB: ICD-10-CM

## 2020-12-23 PROCEDURE — 20550 NJX 1 TENDON SHEATH/LIGAMENT: CPT | Mod: F4

## 2020-12-23 PROCEDURE — 99214 OFFICE O/P EST MOD 30 MIN: CPT | Mod: 25

## 2021-07-25 ENCOUNTER — EMERGENCY (EMERGENCY)
Facility: HOSPITAL | Age: 71
LOS: 1 days | Discharge: HOME | End: 2021-07-25
Attending: EMERGENCY MEDICINE | Admitting: EMERGENCY MEDICINE
Payer: MEDICARE

## 2021-07-25 VITALS
TEMPERATURE: 98 F | SYSTOLIC BLOOD PRESSURE: 160 MMHG | HEART RATE: 54 BPM | WEIGHT: 164.02 LBS | OXYGEN SATURATION: 99 % | HEIGHT: 70 IN | RESPIRATION RATE: 18 BRPM | DIASTOLIC BLOOD PRESSURE: 90 MMHG

## 2021-07-25 VITALS
SYSTOLIC BLOOD PRESSURE: 145 MMHG | DIASTOLIC BLOOD PRESSURE: 71 MMHG | TEMPERATURE: 98 F | OXYGEN SATURATION: 98 % | RESPIRATION RATE: 17 BRPM | HEART RATE: 61 BPM

## 2021-07-25 DIAGNOSIS — Z98.890 OTHER SPECIFIED POSTPROCEDURAL STATES: Chronic | ICD-10-CM

## 2021-07-25 DIAGNOSIS — M65.30 TRIGGER FINGER, UNSPECIFIED FINGER: Chronic | ICD-10-CM

## 2021-07-25 PROCEDURE — 99283 EMERGENCY DEPT VISIT LOW MDM: CPT

## 2021-07-25 PROCEDURE — 99283 EMERGENCY DEPT VISIT LOW MDM: CPT | Mod: 25

## 2021-07-25 PROCEDURE — 73030 X-RAY EXAM OF SHOULDER: CPT

## 2021-07-25 PROCEDURE — 73030 X-RAY EXAM OF SHOULDER: CPT | Mod: 26,RT

## 2021-07-25 RX ADMIN — Medication 500 MILLIGRAM(S): at 14:46

## 2021-07-25 RX ADMIN — Medication 40 MILLIGRAM(S): at 14:28

## 2021-07-25 RX ADMIN — Medication 500 MILLIGRAM(S): at 14:28

## 2021-07-25 NOTE — ED PROVIDER NOTE - PATIENT PORTAL LINK FT
You can access the FollowMyHealth Patient Portal offered by [facility] by registering at the following website: [email or web address]. By joining [employer]’s FollowMyHealth portal, you will also be able to view your health information using other applications (apps) compatible with our system.

## 2021-07-25 NOTE — ED PROVIDER NOTE - CARE PROVIDER_API CALL
Taqueria Olson (MD)  Orthopaedic Surgery  [address] [place] [address]  Phone: [phone]  Fax: [phone]  Follow Up Time:

## 2021-07-25 NOTE — ED ADULT NURSE NOTE - OBJECTIVE STATEMENT
Pt. received alert and oriented x4 with chief complaint of right shoulder pain radiating down right arm for 3 days. Pt. denies injury or fall.

## 2021-07-25 NOTE — ED ADULT NURSE NOTE - PMH
CAD (coronary artery disease)  h/o abnormal stress text, prompted PCI with stenting x 2 arteries in 2016  on plavix and aspirin  Carpal tunnel syndrome    Constipation    Former cigarette smoker  0.5 ppd  X 20 years. Quit ' 90's  History of osteoarthritis    Hyperlipidemia    Right trigger finger    Trigger index finger of left hand  11/2017   Left 2nd Finger

## 2021-07-25 NOTE — ED PROVIDER NOTE - NSFOLLOWUPINSTRUCTIONS_ED_ALL_ED_FT
Rest  Sling x few days  Take NAPROXEN 500 mg every 12-hours for the next 5-days  Take PREDNISONE 40 mg every day for the next 5-days  Follow-up with your doctor this week  Return here if needed

## 2021-07-25 NOTE — ED PROVIDER NOTE - OBJECTIVE STATEMENT
70 yo white male with excessive use of RUE for long period of time, no direct trauma to shoulder now here c/o pain to right shoulder, sharp and increased with movement and better with rest. No weakness. No paresthesia. No rash. No fever or chills.

## 2021-07-25 NOTE — ED ADULT NURSE NOTE - PSH
History of lumbar laminectomy  L5-S1    ' 80's  History of percutaneous coronary intervention  ' 2016     with DEMARIO x 2, 2016  S/P carpal tunnel release  right & trigger finger  Trigger finger  11/2017   Repair of : Left Hand

## 2021-07-25 NOTE — ED PROVIDER NOTE - CONSTITUTIONAL, MLM
normal... Well appearing, white male, awake, alert, oriented to person, place, time/situation and in mild apparent distress.

## 2021-09-16 ENCOUNTER — APPOINTMENT (OUTPATIENT)
Dept: ORTHOPEDIC SURGERY | Facility: CLINIC | Age: 71
End: 2021-09-16
Payer: MEDICARE

## 2021-09-16 DIAGNOSIS — M72.0 PALMAR FASCIAL FIBROMATOSIS [DUPUYTREN]: ICD-10-CM

## 2021-09-16 PROCEDURE — 99214 OFFICE O/P EST MOD 30 MIN: CPT

## 2021-09-17 PROBLEM — M72.0 DUPUYTREN'S CONTRACTURE OF LEFT HAND: Status: ACTIVE | Noted: 2018-01-14

## 2021-10-14 ENCOUNTER — TRANSCRIPTION ENCOUNTER (OUTPATIENT)
Age: 71
End: 2021-10-14

## 2021-10-22 ENCOUNTER — OUTPATIENT (OUTPATIENT)
Dept: OUTPATIENT SERVICES | Facility: HOSPITAL | Age: 71
LOS: 1 days | End: 2021-10-22
Payer: MEDICARE

## 2021-10-22 VITALS
TEMPERATURE: 98 F | SYSTOLIC BLOOD PRESSURE: 137 MMHG | OXYGEN SATURATION: 98 % | WEIGHT: 166.01 LBS | RESPIRATION RATE: 16 BRPM | HEART RATE: 64 BPM | HEIGHT: 70 IN | DIASTOLIC BLOOD PRESSURE: 80 MMHG

## 2021-10-22 DIAGNOSIS — Z98.890 OTHER SPECIFIED POSTPROCEDURAL STATES: Chronic | ICD-10-CM

## 2021-10-22 DIAGNOSIS — M65.332 TRIGGER FINGER, LEFT MIDDLE FINGER: ICD-10-CM

## 2021-10-22 DIAGNOSIS — M65.352 TRIGGER FINGER, LEFT LITTLE FINGER: ICD-10-CM

## 2021-10-22 DIAGNOSIS — Z01.818 ENCOUNTER FOR OTHER PREPROCEDURAL EXAMINATION: ICD-10-CM

## 2021-10-22 DIAGNOSIS — M65.30 TRIGGER FINGER, UNSPECIFIED FINGER: Chronic | ICD-10-CM

## 2021-10-22 DIAGNOSIS — M65.342 TRIGGER FINGER, LEFT RING FINGER: ICD-10-CM

## 2021-10-22 LAB
ANION GAP SERPL CALC-SCNC: 12 MMOL/L — SIGNIFICANT CHANGE UP (ref 5–17)
BUN SERPL-MCNC: 16 MG/DL — SIGNIFICANT CHANGE UP (ref 7–23)
CALCIUM SERPL-MCNC: 9.3 MG/DL — SIGNIFICANT CHANGE UP (ref 8.4–10.5)
CHLORIDE SERPL-SCNC: 100 MMOL/L — SIGNIFICANT CHANGE UP (ref 96–108)
CO2 SERPL-SCNC: 25 MMOL/L — SIGNIFICANT CHANGE UP (ref 22–31)
CREAT SERPL-MCNC: 0.86 MG/DL — SIGNIFICANT CHANGE UP (ref 0.5–1.3)
GLUCOSE SERPL-MCNC: 95 MG/DL — SIGNIFICANT CHANGE UP (ref 70–99)
HCT VFR BLD CALC: 42.3 % — SIGNIFICANT CHANGE UP (ref 39–50)
HGB BLD-MCNC: 14.1 G/DL — SIGNIFICANT CHANGE UP (ref 13–17)
MCHC RBC-ENTMCNC: 29.9 PG — SIGNIFICANT CHANGE UP (ref 27–34)
MCHC RBC-ENTMCNC: 33.3 GM/DL — SIGNIFICANT CHANGE UP (ref 32–36)
MCV RBC AUTO: 89.8 FL — SIGNIFICANT CHANGE UP (ref 80–100)
NRBC # BLD: 0 /100 WBCS — SIGNIFICANT CHANGE UP (ref 0–0)
PLATELET # BLD AUTO: 154 K/UL — SIGNIFICANT CHANGE UP (ref 150–400)
POTASSIUM SERPL-MCNC: 4.2 MMOL/L — SIGNIFICANT CHANGE UP (ref 3.5–5.3)
POTASSIUM SERPL-SCNC: 4.2 MMOL/L — SIGNIFICANT CHANGE UP (ref 3.5–5.3)
RBC # BLD: 4.71 M/UL — SIGNIFICANT CHANGE UP (ref 4.2–5.8)
RBC # FLD: 13.1 % — SIGNIFICANT CHANGE UP (ref 10.3–14.5)
SODIUM SERPL-SCNC: 137 MMOL/L — SIGNIFICANT CHANGE UP (ref 135–145)
WBC # BLD: 6.09 K/UL — SIGNIFICANT CHANGE UP (ref 3.8–10.5)
WBC # FLD AUTO: 6.09 K/UL — SIGNIFICANT CHANGE UP (ref 3.8–10.5)

## 2021-10-22 PROCEDURE — G0463: CPT

## 2021-10-22 PROCEDURE — 80048 BASIC METABOLIC PNL TOTAL CA: CPT

## 2021-10-22 PROCEDURE — 36415 COLL VENOUS BLD VENIPUNCTURE: CPT

## 2021-10-22 PROCEDURE — 85027 COMPLETE CBC AUTOMATED: CPT

## 2021-10-22 RX ORDER — LIDOCAINE HCL 20 MG/ML
0.2 VIAL (ML) INJECTION ONCE
Refills: 0 | Status: DISCONTINUED | OUTPATIENT
Start: 2021-11-05 | End: 2021-11-19

## 2021-10-22 RX ORDER — SODIUM CHLORIDE 9 MG/ML
3 INJECTION INTRAMUSCULAR; INTRAVENOUS; SUBCUTANEOUS EVERY 8 HOURS
Refills: 0 | Status: DISCONTINUED | OUTPATIENT
Start: 2021-11-05 | End: 2021-11-19

## 2021-10-22 RX ORDER — MILK THISTLE 150 MG
1 CAPSULE ORAL
Qty: 0 | Refills: 0 | DISCHARGE

## 2021-10-22 RX ORDER — CLOPIDOGREL BISULFATE 75 MG/1
1 TABLET, FILM COATED ORAL
Qty: 0 | Refills: 0 | DISCHARGE

## 2021-10-22 NOTE — H&P PST ADULT - PROBLEM SELECTOR PLAN 1
Trigger finger left ring finger  Trigger finger left little finger  Procedure: Left long, ring, and little fingers trigger release  -cbc, bmp done at [facility]  -cardiology evaluation  -preop instructions  -instructed in use of surgical scrub

## 2021-10-22 NOTE — H&P PST ADULT - NSICDXPASTMEDICALHX_GEN_ALL_CORE_FT
PAST MEDICAL HISTORY:  CAD (coronary artery disease) -h/o abnormal stress text, prompted PCI with stenting x 2 arteries in 2016,  and 2 stents in 2019      Carpal tunnel syndrome     Constipation     Former cigarette smoker 0.5 ppd  X 20 years. Quit ' 90's    History of osteoarthritis     Hyperlipidemia     Right trigger finger     Trigger index finger of left hand 11/2017   Left 2nd Finger

## 2021-10-22 NOTE — H&P PST ADULT - NSICDXPASTSURGICALHX_GEN_ALL_CORE_FT
PAST SURGICAL HISTORY:  H/O colonoscopy     History of lumbar laminectomy L5-S1   1980's    History of percutaneous coronary intervention --PCI  x 4  2016, 2019    S/P carpal tunnel release -right & trigger finger    Trigger finger -11/2017   Repair of : Left Hand

## 2021-10-22 NOTE — H&P PST ADULT - ATTENDING COMMENTS
The patient has ongoing pain in the left hand primarily ring finger and to a lesser degree long and little finger. Patient has weakness associated with the pain. Patient has not made a full fist with the left hand if he can help it because of the associated pain. Patient had previous left index trigger release which is painless and functioning well.     Patient was thought to have carpal tunnel syndrome in the past. Nerve conduction study 2018 showed very mild left median nerve slowing at the wrist but patient has no symptoms of numbness or tingling in the left hand.     Patient is a surgical candidate because of chronic pain and left long, ring, and little fingers, especially because cortisone injections have low chance of permanent resolution. Furthermore, patient reports satisfaction and a good result following right trigger finger releases.      I reviewed treatment options at length and in detail. The patient has requested surgical treatment for the trigger finger of left long, ring, and little fingers. Carpal tunnel syndrome is essentially quiescent. Therefore, no treatment for left carpal tunnel syndrome is indicated or recommended at this time.       Surgery for left long, ring, little finger trigger finger is indicated because of symptoms refractory to conservative treatment. Surgical plan is A1 pulley release, exam under anesthesia with active finger flexion/extension, and excision of one slip of FDS tendon if there is continued triggering after A1 pulley release.     The patient has pain and interference with activities of daily living. While the patient may see improvement, the patient may not have a complete range of motion or complete return to activities of daily living. Postoperative hand therapy, and other treatment modalities may be required. The range of treatment alternatives, and the associated risks complications limitations and expectations were explained and discussed. Infection, incomplete range of motion, stiffness, pain, palmar fibromatosis are a just few of many factors reviewed and discussed with the patient. Treatment for carpal tunnel syndrome will be considered if symptoms recur prior to surgery. All questions have been answered. The patient has expressed acceptance and understanding of the above and has consented to surgery.

## 2021-10-22 NOTE — H&P PST ADULT - HISTORY OF PRESENT ILLNESS
71 year old right handed male  with PMH of CAD, PCI/ stents x 4 ( 2017, 2019 @ [facility]) on Aspirin 81mg daily, HLD, Osteoarthritis with complaint of pain and triggering in the left hand 3rd, 4th, and 5th digits. Patient reports pain and weakness of the long, ring and little fingers of he left hand.  He denies numbness or tingling in the left hand. He reports that symptoms in the left long, ring, and little fingers have been refractory to conservative treatment.  He presents to PST for evaluation prior to scheduled Left Long, ring, and little fingers trigger release on 11/5/2021.    preop covid screen 11/2 @ [facility]

## 2021-10-27 PROBLEM — I25.10 ATHEROSCLEROTIC HEART DISEASE OF NATIVE CORONARY ARTERY WITHOUT ANGINA PECTORIS: Chronic | Status: ACTIVE | Noted: 2017-11-06

## 2021-11-02 ENCOUNTER — OUTPATIENT (OUTPATIENT)
Dept: OUTPATIENT SERVICES | Facility: HOSPITAL | Age: 71
LOS: 1 days | End: 2021-11-02
Payer: MEDICARE

## 2021-11-02 DIAGNOSIS — Z11.52 ENCOUNTER FOR SCREENING FOR COVID-19: ICD-10-CM

## 2021-11-02 DIAGNOSIS — Z98.890 OTHER SPECIFIED POSTPROCEDURAL STATES: Chronic | ICD-10-CM

## 2021-11-02 DIAGNOSIS — M65.30 TRIGGER FINGER, UNSPECIFIED FINGER: Chronic | ICD-10-CM

## 2021-11-02 LAB — SARS-COV-2 RNA SPEC QL NAA+PROBE: SIGNIFICANT CHANGE UP

## 2021-11-02 PROCEDURE — U0005: CPT

## 2021-11-02 PROCEDURE — C9803: CPT

## 2021-11-02 PROCEDURE — U0003: CPT

## 2021-11-04 ENCOUNTER — TRANSCRIPTION ENCOUNTER (OUTPATIENT)
Age: 71
End: 2021-11-04

## 2021-11-05 ENCOUNTER — OUTPATIENT (OUTPATIENT)
Dept: OUTPATIENT SERVICES | Facility: HOSPITAL | Age: 71
LOS: 1 days | End: 2021-11-05
Payer: MEDICARE

## 2021-11-05 ENCOUNTER — APPOINTMENT (OUTPATIENT)
Dept: ORTHOPEDIC SURGERY | Facility: HOSPITAL | Age: 71
End: 2021-11-05

## 2021-11-05 VITALS
HEIGHT: 70 IN | TEMPERATURE: 97 F | WEIGHT: 166.01 LBS | HEART RATE: 56 BPM | RESPIRATION RATE: 16 BRPM | OXYGEN SATURATION: 100 % | SYSTOLIC BLOOD PRESSURE: 143 MMHG | DIASTOLIC BLOOD PRESSURE: 84 MMHG

## 2021-11-05 VITALS
RESPIRATION RATE: 16 BRPM | DIASTOLIC BLOOD PRESSURE: 75 MMHG | HEART RATE: 60 BPM | TEMPERATURE: 98 F | OXYGEN SATURATION: 99 % | SYSTOLIC BLOOD PRESSURE: 135 MMHG

## 2021-11-05 DIAGNOSIS — M65.342 TRIGGER FINGER, LEFT RING FINGER: ICD-10-CM

## 2021-11-05 DIAGNOSIS — M65.332 TRIGGER FINGER, LEFT MIDDLE FINGER: ICD-10-CM

## 2021-11-05 DIAGNOSIS — M65.30 TRIGGER FINGER, UNSPECIFIED FINGER: Chronic | ICD-10-CM

## 2021-11-05 DIAGNOSIS — M65.352 TRIGGER FINGER, LEFT LITTLE FINGER: ICD-10-CM

## 2021-11-05 DIAGNOSIS — Z98.890 OTHER SPECIFIED POSTPROCEDURAL STATES: Chronic | ICD-10-CM

## 2021-11-05 PROCEDURE — 26055 INCISE FINGER TENDON SHEATH: CPT | Mod: F4

## 2021-11-05 PROCEDURE — 26055 INCISE FINGER TENDON SHEATH: CPT | Mod: 59,F4

## 2021-11-05 RX ORDER — ONDANSETRON 8 MG/1
4 TABLET, FILM COATED ORAL ONCE
Refills: 0 | Status: DISCONTINUED | OUTPATIENT
Start: 2021-11-05 | End: 2021-11-19

## 2021-11-05 RX ORDER — FENTANYL CITRATE 50 UG/ML
25 INJECTION INTRAVENOUS
Refills: 0 | Status: DISCONTINUED | OUTPATIENT
Start: 2021-11-05 | End: 2021-11-05

## 2021-11-05 RX ORDER — CHLORHEXIDINE GLUCONATE 213 G/1000ML
1 SOLUTION TOPICAL ONCE
Refills: 0 | Status: COMPLETED | OUTPATIENT
Start: 2021-11-05 | End: 2021-11-05

## 2021-11-05 RX ORDER — GLUCOSAMINE HCL/CHONDROITIN SU 500-400 MG
1 CAPSULE ORAL
Qty: 0 | Refills: 0 | DISCHARGE

## 2021-11-05 RX ORDER — ASPIRIN/CALCIUM CARB/MAGNESIUM 324 MG
1 TABLET ORAL
Qty: 0 | Refills: 0 | DISCHARGE

## 2021-11-05 RX ORDER — ROSUVASTATIN CALCIUM 5 MG/1
0 TABLET ORAL
Qty: 0 | Refills: 0 | DISCHARGE

## 2021-11-05 RX ORDER — LACTOBACILLUS ACIDOPHILUS 100MM CELL
1 CAPSULE ORAL
Qty: 0 | Refills: 0 | DISCHARGE

## 2021-11-05 RX ORDER — SODIUM CHLORIDE 9 MG/ML
1000 INJECTION, SOLUTION INTRAVENOUS
Refills: 0 | Status: DISCONTINUED | OUTPATIENT
Start: 2021-11-05 | End: 2021-11-19

## 2021-11-05 RX ADMIN — CHLORHEXIDINE GLUCONATE 1 APPLICATION(S): 213 SOLUTION TOPICAL at 07:09

## 2021-11-05 RX ADMIN — SODIUM CHLORIDE 3 MILLILITER(S): 9 INJECTION INTRAMUSCULAR; INTRAVENOUS; SUBCUTANEOUS at 07:10

## 2021-11-05 NOTE — ASU DISCHARGE PLAN (ADULT/PEDIATRIC) - CALL YOUR DOCTOR IF YOU HAVE ANY OF THE FOLLOWING:
Bleeding that does not stop/Fever greater than (need to indicate Fahrenheit or Celsius)/Numbness, tingling, color or temperature change to extremity

## 2021-11-05 NOTE — BRIEF OPERATIVE NOTE - NSICDXBRIEFPREOP_GEN_ALL_CORE_FT
PRE-OP DIAGNOSIS:  Trigger ring finger of left hand 05-Nov-2021 09:48:59  Reji Olea  Trigger middle finger of left hand 05-Nov-2021 09:49:13  Reji Olea  Trigger little finger of left hand 05-Nov-2021 09:50:28  Reji Olea

## 2021-11-05 NOTE — BRIEF OPERATIVE NOTE - NSICDXBRIEFPOSTOP_GEN_ALL_CORE_FT
POST-OP DIAGNOSIS:  Trigger ring finger of left hand 05-Nov-2021 09:50:53  Reji Olea  Trigger middle finger of left hand 05-Nov-2021 09:51:05  Reji Olea  Trigger little finger of left hand 05-Nov-2021 09:51:16  Reji Olea

## 2021-11-05 NOTE — ASU PATIENT PROFILE, ADULT - ACCEPTABLE
Called patient to assist with scheduling labs prior to appt.  No answer, left voicemail requesting a call back.  Will mail out reminder for mammo per request.   2

## 2021-11-05 NOTE — ASU DISCHARGE PLAN (ADULT/PEDIATRIC) - CARE PROVIDER_API CALL
Shahzad Sr (MD)  Orthopaedic Surgery; Surgery of the Hand  [address], [facility] [address]  [place] [address]  Phone: [phone]  Fax: [phone]  Follow Up Time: 1 week

## 2021-11-15 ENCOUNTER — APPOINTMENT (OUTPATIENT)
Dept: ORTHOPEDIC SURGERY | Facility: CLINIC | Age: 71
End: 2021-11-15
Payer: MEDICARE

## 2021-11-15 VITALS — BODY MASS INDEX: 23.48 KG/M2 | WEIGHT: 164 LBS | HEIGHT: 70 IN

## 2021-11-15 DIAGNOSIS — M65.352 TRIGGER FINGER, LEFT LITTLE FINGER: ICD-10-CM

## 2021-11-15 DIAGNOSIS — M65.342 TRIGGER FINGER, LEFT RING FINGER: ICD-10-CM

## 2021-11-15 DIAGNOSIS — M65.332 TRIGGER FINGER, LEFT MIDDLE FINGER: ICD-10-CM

## 2021-11-15 PROCEDURE — 99024 POSTOP FOLLOW-UP VISIT: CPT

## 2021-11-15 NOTE — HISTORY OF PRESENT ILLNESS
[de-identified] : First postop visit left hand [de-identified] : Patient is 71-year-old male returns after release of trigger finger release left long, ring, little fingers for November 5, 2021.\par Patient reports able to fully flex little finger and fairly good flexion long and ring finger.  Patient reports no triggering in these fingers.  No other complaints. [de-identified] : Left hand: Long, ring, little finger trigger release incisions wounds healed, clean and dry. No redness or sign of infection. Sutures removed.\par Active flexion long and ring fingers within 1 cm pulp to palm.  Little finger active flexion nearly full.  No triggering.  Extension shows mild PIP contractures long ring little fingers.  Sensory intact.  Minimal swelling. [de-identified] : Good early result after release of trigger finger left long, ring, little fingers.\par Patient has full passive range of motion and nearly full active finger motion.  Minimal pain. [de-identified] : 1.  Home exercise program explained, demonstrated, properly executed by patient.\par 2.  Patient should do home exercises to regain range of motion.  Patient feels confident that he will able to do so that he will not require supervised hand therapy.  If the patient has issues he should return for referral to hand therapy.\par 3.  Postop instructions including activities, use of hand/wrist, wound care, limitations and expectations have been explained, discussed, and reviewed with the patient. \par The patient should be cautious in activities for one to two weeks.  Thereafter, the patient should increase to full use as pain permits.  If the patient is comfortable and doing well, then the patient should return p.r.n.  If there are questions or problems, the patient should call, or will be seen as needed.  All questions were answered.  The patient has expressed acceptance and understanding of analysis, treatment, and recommendations.

## 2022-02-01 ENCOUNTER — TRANSCRIPTION ENCOUNTER (OUTPATIENT)
Age: 72
End: 2022-02-01

## 2022-02-02 ENCOUNTER — OUTPATIENT (OUTPATIENT)
Dept: OUTPATIENT SERVICES | Facility: HOSPITAL | Age: 72
LOS: 1 days | End: 2022-02-02
Payer: MEDICARE

## 2022-02-02 ENCOUNTER — RESULT REVIEW (OUTPATIENT)
Age: 72
End: 2022-02-02

## 2022-02-02 DIAGNOSIS — Z98.890 OTHER SPECIFIED POSTPROCEDURAL STATES: Chronic | ICD-10-CM

## 2022-02-02 DIAGNOSIS — R19.4 CHANGE IN BOWEL HABIT: ICD-10-CM

## 2022-02-02 DIAGNOSIS — M65.30 TRIGGER FINGER, UNSPECIFIED FINGER: Chronic | ICD-10-CM

## 2022-02-02 DIAGNOSIS — Z12.11 ENCOUNTER FOR SCREENING FOR MALIGNANT NEOPLASM OF COLON: ICD-10-CM

## 2022-02-02 PROCEDURE — 88305 TISSUE EXAM BY PATHOLOGIST: CPT | Mod: 26

## 2022-02-02 PROCEDURE — 45380 COLONOSCOPY AND BIOPSY: CPT

## 2022-02-02 PROCEDURE — 88305 TISSUE EXAM BY PATHOLOGIST: CPT

## 2022-02-02 DEVICE — HEMOSPRAY HEMOSTAT ENDO 7F: Type: IMPLANTABLE DEVICE | Status: FUNCTIONAL

## 2022-02-02 DEVICE — CLIP RESOLUTION 360 235CM: Type: IMPLANTABLE DEVICE | Status: FUNCTIONAL

## 2022-02-02 DEVICE — SYR ORISE GEL SNGL PACK: Type: IMPLANTABLE DEVICE | Status: FUNCTIONAL

## 2022-02-03 LAB — SURGICAL PATHOLOGY STUDY: SIGNIFICANT CHANGE UP

## 2022-04-28 NOTE — ASU DISCHARGE PLAN (ADULT/PEDIATRIC) - CARE COORDINATION DISCHARGE PLANNING
Implemented All Universal Safety Interventions:  [place] to call system. Call bell, personal items and telephone within reach. Instruct patient to call for assistance. Room bathroom lighting operational. Non-slip footwear when patient is off stretcher. Physically safe environment: no spills, clutter or unnecessary equipment. Stretcher in lowest position, wheels locked, appropriate side rails in place. No

## 2023-04-28 NOTE — PRE-ANESTHESIA EVALUATION ADULT - NSATTENDATTESTRD_GEN_ALL_CORE
The patient has been re-examined and I agree with the above assessment or I updated with my findings.
electronic

## 2023-06-15 ENCOUNTER — APPOINTMENT (OUTPATIENT)
Dept: ORTHOPEDIC SURGERY | Facility: CLINIC | Age: 73
End: 2023-06-15
Payer: MEDICARE

## 2023-06-15 VITALS — HEIGHT: 70 IN | WEIGHT: 170 LBS | BODY MASS INDEX: 24.34 KG/M2

## 2023-06-15 DIAGNOSIS — M77.12 LATERAL EPICONDYLITIS, LEFT ELBOW: ICD-10-CM

## 2023-06-15 DIAGNOSIS — M23.91 UNSPECIFIED INTERNAL DERANGEMENT OF RIGHT KNEE: ICD-10-CM

## 2023-06-15 PROCEDURE — 20551 NJX 1 TENDON ORIGIN/INSJ: CPT

## 2023-06-15 PROCEDURE — 73080 X-RAY EXAM OF ELBOW: CPT | Mod: LT

## 2023-06-15 PROCEDURE — 73562 X-RAY EXAM OF KNEE 3: CPT | Mod: LT

## 2023-06-15 PROCEDURE — 99204 OFFICE O/P NEW MOD 45 MIN: CPT | Mod: 25

## 2023-06-15 NOTE — HISTORY OF PRESENT ILLNESS
[Nothing helps with pain getting better] : Nothing helps with pain getting better [Walking] : walking [Lying in bed] : lying in bed [Full time] : Work status: full time [de-identified] : 6/15/23  Initial visit for this 73 year male RHD complaining of spontaneous onset of lt elbow pain x last 2-3 ,months duration. Worse at night and lifting things. NO hx of trauma. Tried Mobic prn and Tylenol prn w/o relief.\par  Also c/o chronic rt knee pain x last 5 years duration. Hurts only lying down at night with his knees on top of each other. NO pain walking or stairclimbing. Hurts arising from a sitting position.. Sharp pain lasts only 10-20 seconds.\par \par PMH: Occ. lt elbow complaints in the past.  Hx of stents. Not on a blood thinner. [] : no [FreeTextEntry1] : left elbow / right knee [de-identified] : lifting- elbow/Kneeling- knee [de-identified] : self

## 2023-06-15 NOTE — REASON FOR VISIT
[FreeTextEntry2] : Sharp left elbow pain past 2 months with pain level 8 active and 0 resting/ Sharp right knee pain many years with pain level active 8 and rest 0.

## 2023-06-15 NOTE — PHYSICAL EXAM
[Normal Mood and Affect] : normal mood and affect [Able to Communicate] : able to communicate [Well Developed] : well developed [Well Nourished] : well nourished [NL (150)] : flexion 150 degrees [NL (0)] : extension 0 degrees [NL (90)] : supination 90 degrees [5___] : hamstring 5[unfilled]/5 [Left] : left elbow [Right] : right knee [AP] : anteroposterior [Lateral] : lateral [[place]] : skyline [There are no fractures, subluxations or dislocations. No significant abnormalities are seen] : There are no fractures, subluxations or dislocations. No significant abnormalities are seen [] : non-antalgic

## 2023-06-15 NOTE — PROCEDURE
Patient elected to continue to observe for now. [Tendon Origin] : tendon origin [Left] : of the left [Lateral Epicondyle] : lateral epicondyle [Pain] : pain [Inflammation] : inflammation [Alcohol] : alcohol [Ethyl Chloride sprayed topically] : ethyl chloride sprayed topically [___ cc    1%] : Lidocaine ~Vcc of 1%  [___ cc    40mg] : Methylprednisolone (Depomedrol) ~Vcc of 40 mg  [] : Patient tolerated procedure well [Call if redness, pain or fever occur] : call if redness, pain or fever occur [Apply ice for 15min out of every hour for the next 12-24 hours as tolerated] : apply ice for 15 minutes out of every hour for the next 12-24 hours as tolerated [Risks, benefits, alternatives discussed / Verbal consent obtained] : the risks benefits, and alternatives have been discussed, and verbal consent was obtained

## 2023-06-15 NOTE — DISCUSSION/SUMMARY
[de-identified] : "Written by Katy Aviles, acting as Scribe for Gallo Romero MD."\par \par Dr. Romero - \par The documentation recorded by the scribe accurately reflects the service I personally performed and the decisions made by me.

## 2024-09-05 NOTE — H&P PST ADULT - NS ABD PE RECTAL EXAM
"Spoke to patient, \"someone from the pharmacy called me yesterday. It is on my list of things to do.. I was out of town and didn't want a delivery when I was gone.. I will call the pharmacy..\"     Angelita Lopez RN        " patient refused

## 2025-01-14 ENCOUNTER — APPOINTMENT (OUTPATIENT)
Dept: ORTHOPEDIC SURGERY | Facility: CLINIC | Age: 75
End: 2025-01-14
Payer: MEDICARE

## 2025-01-14 VITALS — SYSTOLIC BLOOD PRESSURE: 157 MMHG | HEART RATE: 59 BPM | DIASTOLIC BLOOD PRESSURE: 89 MMHG

## 2025-01-14 DIAGNOSIS — G56.02 CARPAL TUNNEL SYNDROME, LEFT UPPER LIMB: ICD-10-CM

## 2025-01-14 DIAGNOSIS — M65.312 TRIGGER THUMB, LEFT THUMB: ICD-10-CM

## 2025-01-14 DIAGNOSIS — Z78.9 OTHER SPECIFIED HEALTH STATUS: ICD-10-CM

## 2025-01-14 PROCEDURE — 20550 NJX 1 TENDON SHEATH/LIGAMENT: CPT | Mod: LT

## 2025-01-14 PROCEDURE — 99204 OFFICE O/P NEW MOD 45 MIN: CPT | Mod: 25

## 2025-02-10 NOTE — H&P PST ADULT - RESPIRATORY AND THORAX
Nerve Block    Date/Time: 2/10/2025 7:30 AM    Performed by: Marlon Rivers CRNA  Authorized by: Marlon Rivers CRNA    Block Type: iPACK  Laterality:  Right  Patient Location:  Pre-op  Indication: post-op pain management at surgeon's request    Preanesthetic Checklist Patient Identified (2 criteria), Block Plan Confirmed, Resuscitation Equipment Available, Supplemental O2 (if needed), Allergies Confirmed, Block Site Marked (if applicable), Monitors Applied, Aseptic Technique, Coagulation Status, Necessary Block Equipment Present, Timeout Performed, IV Access Functioning, Consent Verified, Drugs/Solutions Labeled and Sedation Given (if needed)    Patient Position:  Left lateral decubitus  Prep:  Chlorhexidine gluconate (CHG)  Max Sterile Barrier Technique:  Hand washing, sterile gel, cap/mask and sterile gloves  Monitoring:  Blood pressure, continuous pulse oximetry and heart rate  Injection Technique:  Single-shot  Procedures: ultrasound guided and ultrasound permanent image saved    Needle Type:  Echogenic  Needle Gauge:  21 G  Needle Length:  8 cm  Needle Localization:  Ultrasound guidance  Physical status during block:  Sedated  Medications Administered  MIDazolam (VERSED) 1 mg/mL - Intravenous   4 mg - 2/10/2025 7:30:00 AM  fentaNYL (SUBLIMAZE) 50 mcg/mL - Intravenous   50 mcg - 2/10/2025 7:30:00 AM  ropivacaine (NAROPIN) 0.2 % - Infiltration   20 mL - 2/10/2025 7:30:00 AM  Injection Assessment:  Negative aspiration for heme, local visualized surrounding nerve on ultrasound, no resistance to injection and incremental injection  Patient Condition:  Tolerated well, no immediate complications  Heart Rate Change: No    Slowly Injected: Yes    Start Time:2/10/2025 7:30 AM  Stop Time: 2/10/2025 7:33 AM       negative

## 2025-02-25 NOTE — BRIEF OPERATIVE NOTE - ANTIBIOTIC PROTOCOL
Pre-Procedure Local Anesthetic Only     PROCEDURAL ASSESSMENT    Procedure date: 2/25/2025  Preop Diagnosis/Indications for Procedure:  Cervical Radiculopathy  Planned Procedure: Interlaminar Epidural Steroid Injection, Cervicothoracic, C7-T1, Left parasagittal  Planned Anesthesia: Local    Informed Consent was obtained, patient or responsible party denies additional questions regarding procedure.    MEDICAL HISTORY / COMORBID CONDITIONS    Medical/ Surgical History Reviewed: Yes   Normal Mental Status: Yes    Constitutional: No acute distress     OTHER FINDINGS:  Visit Vitals  BP (!) 158/88   Pulse 83   Temp 97 °F (36.1 °C) (Temporal)   Resp 16   SpO2 95%       Reviewed  Current Medications and Allergies:  Yes  Patient asked to hold any blood thinning medications for procedure: Yes, held for appropriate interval  History of any bleeding/clotting disorders: No    Reviewed pertinent lab/diagnostic tests:    No labs ordered for this encounter      Assessing Provider: ANEL Rivera  Date: 2/25/2025  Time: 12:24 PM     Followed protocol

## 2025-05-12 ENCOUNTER — OFFICE (OUTPATIENT)
Dept: URBAN - METROPOLITAN AREA CLINIC 109 | Facility: CLINIC | Age: 75
Setting detail: OPHTHALMOLOGY
End: 2025-05-12
Payer: MEDICARE

## 2025-05-12 DIAGNOSIS — H25.13: ICD-10-CM

## 2025-05-12 DIAGNOSIS — H18.413: ICD-10-CM

## 2025-05-12 PROBLEM — H53.10 SUBJECTIVE VISUAL DISTRUBANCES: Status: ACTIVE | Noted: 2025-05-12

## 2025-05-12 PROCEDURE — 92004 COMPRE OPH EXAM NEW PT 1/>: CPT | Performed by: OPHTHALMOLOGY

## 2025-05-12 ASSESSMENT — TONOMETRY
OS_IOP_MMHG: 16
OD_IOP_MMHG: 16

## 2025-05-12 ASSESSMENT — REFRACTION_CURRENTRX
OS_AXIS: 26
OS_CYLINDER: -0.50
OD_OVR_VA: 20/
OS_SPHERE: -1.00
OS_OVR_VA: 20/
OD_CYLINDER: -0.50
OD_AXIS: 174
OD_SPHERE: -0.50

## 2025-05-12 ASSESSMENT — CONFRONTATIONAL VISUAL FIELD TEST (CVF)
OD_FINDINGS: FULL
OS_FINDINGS: FULL

## 2025-05-12 ASSESSMENT — REFRACTION_MANIFEST
OS_AXIS: 26
OD_AXIS: 174
OD_CYLINDER: -0.50
OD_SPHERE: -0.50
OS_CYLINDER: -0.50
OS_SPHERE: -1.00

## 2025-05-12 ASSESSMENT — REFRACTION_AUTOREFRACTION
OS_AXIS: 46
OS_CYLINDER: -0.25
OD_SPHERE: -1.00
OS_SPHERE: -1.75
OD_CYLINDER: -0.75
OD_AXIS: 132

## 2025-05-12 ASSESSMENT — VISUAL ACUITY
OS_BCVA: 20/20-1
OD_BCVA: 20/20-1

## (undated) DEVICE — TUBING IV SET SECONDARY 34"

## (undated) DEVICE — TUBING ENDO EXT OLYMPUS 160 24HR USE

## (undated) DEVICE — FORCEP RADIAL JAW 4 JUMBO 2.8MM 3.2MM 240CM ORANGE DISP

## (undated) DEVICE — TRAP QUICK CATCH  SINGL CHAMBER

## (undated) DEVICE — SYR IV POSIFLUSH NS 3ML 30/TY

## (undated) DEVICE — SNARE POLYP SENS 27MM 240CM

## (undated) DEVICE — SUCTION YANKAUER TAPERED BULBOUS NO VENT

## (undated) DEVICE — PACK IV START WITH CHG

## (undated) DEVICE — SOL IRR POUR H2O 500ML

## (undated) DEVICE — ELCTR GROUNDING PAD ADULT COVIDIEN

## (undated) DEVICE — SUT HEWSON RETRIEVER

## (undated) DEVICE — MASK OXYGEN PANORAMIC

## (undated) DEVICE — CANISTER SUCTION 1200CC 10/SL

## (undated) DEVICE — SYR SLIP TIP 60CC

## (undated) DEVICE — SNARE LRG

## (undated) DEVICE — VALVE BIOPSY

## (undated) DEVICE — TUBE O2 SUPL CRUSH RESIS CONN SOUTHSIDE ONLY

## (undated) DEVICE — SYR SLIP LOK 30CC

## (undated) DEVICE — ENDOCUFF VISION SZ 3 SM PRPL

## (undated) DEVICE — NDL INJ SCLERO INTERJECT 23G

## (undated) DEVICE — MASK O2 NON REBREATH 3IN1 ADULT

## (undated) DEVICE — TUBING SUCTION CONN 6FT STERILE

## (undated) DEVICE — MARKER ENDO SPOT EX

## (undated) DEVICE — TUBING IV SET GRAVITY 3Y 100" MACRO

## (undated) DEVICE — CATH IV SAFE BC BLU 22GAX1.0"

## (undated) DEVICE — VALVE ENDOSCOPE DEFENDO SINGLE USE

## (undated) DEVICE — BRUSH COLONOSCOPY CYTOLOGY

## (undated) DEVICE — ENDOCUFF VISION SZ 2 LG GRN

## (undated) DEVICE — FORCEP RADIAL JAW 4 W NDL 2.4MM 2.8MM 240CM ORANGE DISP

## (undated) DEVICE — Device

## (undated) DEVICE — CATH IV SAFE BC PINK 20GA X 1.16"

## (undated) DEVICE — FORMALIN CUPS 10% BUFFERED

## (undated) DEVICE — SENS OXI DGT OXISENSOR II ADLN

## (undated) DEVICE — TRAP E POLY

## (undated) DEVICE — RETRIEVER ROTH NET PLATINUM-UNIVERSAL

## (undated) DEVICE — TUBING CANNULA SALTER LABS NASAL ADULT 7FT